# Patient Record
Sex: FEMALE | Race: ASIAN | NOT HISPANIC OR LATINO
[De-identification: names, ages, dates, MRNs, and addresses within clinical notes are randomized per-mention and may not be internally consistent; named-entity substitution may affect disease eponyms.]

---

## 2017-05-22 ENCOUNTER — APPOINTMENT (OUTPATIENT)
Dept: CT IMAGING | Facility: CLINIC | Age: 62
End: 2017-05-22

## 2017-05-22 ENCOUNTER — OUTPATIENT (OUTPATIENT)
Dept: OUTPATIENT SERVICES | Facility: HOSPITAL | Age: 62
LOS: 1 days | End: 2017-05-22
Payer: COMMERCIAL

## 2017-05-22 DIAGNOSIS — Z00.8 ENCOUNTER FOR OTHER GENERAL EXAMINATION: ICD-10-CM

## 2017-05-22 PROCEDURE — 82565 ASSAY OF CREATININE: CPT

## 2017-05-22 PROCEDURE — 74177 CT ABD & PELVIS W/CONTRAST: CPT

## 2017-05-26 ENCOUNTER — OUTPATIENT (OUTPATIENT)
Dept: OUTPATIENT SERVICES | Facility: HOSPITAL | Age: 62
LOS: 1 days | End: 2017-05-26
Payer: COMMERCIAL

## 2017-05-26 ENCOUNTER — APPOINTMENT (OUTPATIENT)
Dept: CT IMAGING | Facility: CLINIC | Age: 62
End: 2017-05-26

## 2017-05-26 DIAGNOSIS — Z00.8 ENCOUNTER FOR OTHER GENERAL EXAMINATION: ICD-10-CM

## 2017-05-26 PROCEDURE — 71250 CT THORAX DX C-: CPT

## 2017-07-06 ENCOUNTER — APPOINTMENT (OUTPATIENT)
Dept: PULMONOLOGY | Facility: CLINIC | Age: 62
End: 2017-07-06

## 2017-07-06 VITALS
RESPIRATION RATE: 14 BRPM | WEIGHT: 154 LBS | HEIGHT: 61 IN | HEART RATE: 80 BPM | BODY MASS INDEX: 29.07 KG/M2 | DIASTOLIC BLOOD PRESSURE: 70 MMHG | OXYGEN SATURATION: 99 % | SYSTOLIC BLOOD PRESSURE: 125 MMHG

## 2017-07-06 DIAGNOSIS — R42 DIZZINESS AND GIDDINESS: ICD-10-CM

## 2017-07-06 DIAGNOSIS — Z78.9 OTHER SPECIFIED HEALTH STATUS: ICD-10-CM

## 2017-07-06 DIAGNOSIS — Z82.49 FAMILY HISTORY OF ISCHEMIC HEART DISEASE AND OTHER DISEASES OF THE CIRCULATORY SYSTEM: ICD-10-CM

## 2017-07-06 DIAGNOSIS — Z80.42 FAMILY HISTORY OF MALIGNANT NEOPLASM OF PROSTATE: ICD-10-CM

## 2017-07-06 DIAGNOSIS — M81.0 AGE-RELATED OSTEOPOROSIS W/OUT CURRENT PATHOLOGICAL FRACTURE: ICD-10-CM

## 2017-07-06 DIAGNOSIS — Z83.49 FAMILY HISTORY OF OTHER ENDOCRINE, NUTRITIONAL AND METABOLIC DISEASES: ICD-10-CM

## 2017-07-06 DIAGNOSIS — Z86.39 PERSONAL HISTORY OF OTHER ENDOCRINE, NUTRITIONAL AND METABOLIC DISEASE: ICD-10-CM

## 2017-07-06 RX ORDER — OMEPRAZOLE 40 MG/1
40 CAPSULE, DELAYED RELEASE ORAL
Refills: 0 | Status: ACTIVE | COMMUNITY

## 2017-07-07 ENCOUNTER — MEDICATION RENEWAL (OUTPATIENT)
Age: 62
End: 2017-07-07

## 2017-07-07 LAB
24R-OH-CALCIDIOL SERPL-MCNC: 69.9 PG/ML
25(OH)D3 SERPL-MCNC: 24.1 NG/ML

## 2017-07-08 ENCOUNTER — TRANSCRIPTION ENCOUNTER (OUTPATIENT)
Age: 62
End: 2017-07-08

## 2017-07-10 LAB — ACE BLD-CCNC: 46 U/L

## 2017-07-11 LAB — HP PNL SER: NORMAL

## 2017-08-09 ENCOUNTER — CHART COPY (OUTPATIENT)
Age: 62
End: 2017-08-09

## 2017-09-06 ENCOUNTER — APPOINTMENT (OUTPATIENT)
Dept: PULMONOLOGY | Facility: CLINIC | Age: 62
End: 2017-09-06
Payer: COMMERCIAL

## 2017-09-06 VITALS
HEIGHT: 60 IN | DIASTOLIC BLOOD PRESSURE: 70 MMHG | RESPIRATION RATE: 14 BRPM | SYSTOLIC BLOOD PRESSURE: 120 MMHG | BODY MASS INDEX: 31.22 KG/M2 | OXYGEN SATURATION: 98 % | HEART RATE: 79 BPM | WEIGHT: 159 LBS

## 2017-09-06 PROCEDURE — 94010 BREATHING CAPACITY TEST: CPT

## 2017-09-06 PROCEDURE — 99214 OFFICE O/P EST MOD 30 MIN: CPT | Mod: 25

## 2017-10-18 ENCOUNTER — APPOINTMENT (OUTPATIENT)
Dept: MAMMOGRAPHY | Facility: CLINIC | Age: 62
End: 2017-10-18
Payer: COMMERCIAL

## 2017-10-18 ENCOUNTER — APPOINTMENT (OUTPATIENT)
Dept: ULTRASOUND IMAGING | Facility: CLINIC | Age: 62
End: 2017-10-18
Payer: COMMERCIAL

## 2017-10-18 ENCOUNTER — OUTPATIENT (OUTPATIENT)
Dept: OUTPATIENT SERVICES | Facility: HOSPITAL | Age: 62
LOS: 1 days | End: 2017-10-18
Payer: COMMERCIAL

## 2017-10-18 DIAGNOSIS — Z00.8 ENCOUNTER FOR OTHER GENERAL EXAMINATION: ICD-10-CM

## 2017-10-18 PROCEDURE — G0202: CPT | Mod: 26

## 2017-10-18 PROCEDURE — 76641 ULTRASOUND BREAST COMPLETE: CPT | Mod: 26,50

## 2017-10-18 PROCEDURE — 77063 BREAST TOMOSYNTHESIS BI: CPT | Mod: 26

## 2017-10-18 PROCEDURE — 77067 SCR MAMMO BI INCL CAD: CPT

## 2017-10-18 PROCEDURE — 76641 ULTRASOUND BREAST COMPLETE: CPT

## 2017-10-18 PROCEDURE — 77063 BREAST TOMOSYNTHESIS BI: CPT

## 2017-11-05 ENCOUNTER — FORM ENCOUNTER (OUTPATIENT)
Age: 62
End: 2017-11-05

## 2017-11-06 ENCOUNTER — APPOINTMENT (OUTPATIENT)
Dept: CT IMAGING | Facility: CLINIC | Age: 62
End: 2017-11-06
Payer: COMMERCIAL

## 2017-11-06 ENCOUNTER — OUTPATIENT (OUTPATIENT)
Dept: OUTPATIENT SERVICES | Facility: HOSPITAL | Age: 62
LOS: 1 days | End: 2017-11-06
Payer: COMMERCIAL

## 2017-11-06 DIAGNOSIS — R93.8 ABNORMAL FINDINGS ON DIAGNOSTIC IMAGING OF OTHER SPECIFIED BODY STRUCTURES: ICD-10-CM

## 2017-11-06 PROCEDURE — 71250 CT THORAX DX C-: CPT | Mod: 26

## 2017-11-06 PROCEDURE — 71250 CT THORAX DX C-: CPT

## 2017-11-13 ENCOUNTER — FORM ENCOUNTER (OUTPATIENT)
Age: 62
End: 2017-11-13

## 2017-11-14 ENCOUNTER — OUTPATIENT (OUTPATIENT)
Dept: OUTPATIENT SERVICES | Facility: HOSPITAL | Age: 62
LOS: 1 days | End: 2017-11-14
Payer: COMMERCIAL

## 2017-11-14 ENCOUNTER — APPOINTMENT (OUTPATIENT)
Dept: CT IMAGING | Facility: CLINIC | Age: 62
End: 2017-11-14
Payer: COMMERCIAL

## 2017-11-14 DIAGNOSIS — R93.8 ABNORMAL FINDINGS ON DIAGNOSTIC IMAGING OF OTHER SPECIFIED BODY STRUCTURES: ICD-10-CM

## 2017-11-14 PROCEDURE — 74177 CT ABD & PELVIS W/CONTRAST: CPT

## 2017-11-14 PROCEDURE — 74177 CT ABD & PELVIS W/CONTRAST: CPT | Mod: 26

## 2017-11-14 PROCEDURE — 82565 ASSAY OF CREATININE: CPT

## 2017-11-27 ENCOUNTER — CLINICAL ADVICE (OUTPATIENT)
Age: 62
End: 2017-11-27

## 2018-01-08 ENCOUNTER — APPOINTMENT (OUTPATIENT)
Dept: PULMONOLOGY | Facility: CLINIC | Age: 63
End: 2018-01-08

## 2018-01-31 ENCOUNTER — OUTPATIENT (OUTPATIENT)
Dept: OUTPATIENT SERVICES | Facility: HOSPITAL | Age: 63
LOS: 1 days | End: 2018-01-31
Payer: COMMERCIAL

## 2018-01-31 ENCOUNTER — APPOINTMENT (OUTPATIENT)
Dept: CT IMAGING | Facility: CLINIC | Age: 63
End: 2018-01-31

## 2018-01-31 DIAGNOSIS — Z00.8 ENCOUNTER FOR OTHER GENERAL EXAMINATION: ICD-10-CM

## 2018-01-31 PROCEDURE — 82565 ASSAY OF CREATININE: CPT

## 2018-01-31 PROCEDURE — 74177 CT ABD & PELVIS W/CONTRAST: CPT | Mod: 26

## 2018-01-31 PROCEDURE — 74177 CT ABD & PELVIS W/CONTRAST: CPT

## 2018-07-22 PROBLEM — Z78.9 ALCOHOL USE: Status: ACTIVE | Noted: 2017-07-06

## 2018-12-06 ENCOUNTER — APPOINTMENT (OUTPATIENT)
Dept: PULMONOLOGY | Facility: CLINIC | Age: 63
End: 2018-12-06
Payer: MEDICARE

## 2018-12-06 ENCOUNTER — FORM ENCOUNTER (OUTPATIENT)
Age: 63
End: 2018-12-06

## 2018-12-06 VITALS
HEART RATE: 83 BPM | HEIGHT: 59 IN | OXYGEN SATURATION: 98 % | BODY MASS INDEX: 32.25 KG/M2 | RESPIRATION RATE: 17 BRPM | SYSTOLIC BLOOD PRESSURE: 143 MMHG | WEIGHT: 160 LBS | DIASTOLIC BLOOD PRESSURE: 92 MMHG

## 2018-12-06 PROCEDURE — 71046 X-RAY EXAM CHEST 2 VIEWS: CPT

## 2018-12-06 PROCEDURE — 94618 PULMONARY STRESS TESTING: CPT

## 2018-12-06 PROCEDURE — 99215 OFFICE O/P EST HI 40 MIN: CPT | Mod: 25

## 2018-12-06 NOTE — REVIEW OF SYSTEMS
[As Noted in HPI] : as noted in HPI [Myalgias] : myalgias [Arthralgias] : arthralgias [Negative] : Sleep Disorder

## 2018-12-07 ENCOUNTER — OUTPATIENT (OUTPATIENT)
Dept: OUTPATIENT SERVICES | Facility: HOSPITAL | Age: 63
LOS: 1 days | End: 2018-12-07
Payer: MEDICARE

## 2018-12-07 ENCOUNTER — APPOINTMENT (OUTPATIENT)
Dept: CT IMAGING | Facility: CLINIC | Age: 63
End: 2018-12-07
Payer: MEDICARE

## 2018-12-07 ENCOUNTER — APPOINTMENT (OUTPATIENT)
Dept: PULMONOLOGY | Facility: CLINIC | Age: 63
End: 2018-12-07
Payer: MEDICARE

## 2018-12-07 DIAGNOSIS — Z00.8 ENCOUNTER FOR OTHER GENERAL EXAMINATION: ICD-10-CM

## 2018-12-07 PROCEDURE — 71250 CT THORAX DX C-: CPT | Mod: 26

## 2018-12-07 PROCEDURE — 94729 DIFFUSING CAPACITY: CPT

## 2018-12-07 PROCEDURE — 94727 GAS DIL/WSHOT DETER LNG VOL: CPT

## 2018-12-07 PROCEDURE — 71250 CT THORAX DX C-: CPT

## 2018-12-07 PROCEDURE — 94010 BREATHING CAPACITY TEST: CPT

## 2018-12-07 NOTE — ASSESSMENT
[FreeTextEntry1] : Ms. Mccarthy is a 61 year old female born in [place] former smoker with hx of HLD, osteoporosis, arthritis, migraines, COPD, abnormal CT and vertigo who presents to the office today for a pulmonary reevaluation. \par \par Problem 1: Abnormal CT scan\par -most consistent with inflammation. ?Hypersensitivity pneumonitis or sarcoidosis ; lung Cancer @ [facility]\par -ACE level and hypersensitivity level - both normal\par -Follow up CT scan (11/17) - get now - yearly CAT scan going forward\par -PPD (-)\par \par Problem 2: COPD at risk secondary to prior smoking hx. \par -smoking cessation. \par \par Problem 3: Nicotine addiction\par -successful smoking cessation\par \par Problem 4: GERD\par -continue Omeprazole 40 mg - pre-breakfast \par -Rule of 2's: avoid eating too much, eating too late, eating too spicy, or eating within 2 hours of sleeping\par -Things to avoid including overeating, spicy foods, tight clothing, eating within three hours of bed, this list is not all inclusive. \par -For treatment of reflux, possible options discussed including diet control, H2 blockers, PPIs, as well as coating motility agents discussed as treatment options. Timing of meals and proximity of last meal to sleep were discussed. If symptoms persist, a formal gastrointestinal evaluation is needed.\par \par Problem 5: Postnasal drip\par -Olopatadine nasal spray .06% 1 sniff each nostril BID \par \par Problem 6: Overweight\par -Weight loss, exercise, and diet control were discussed and are highly encouraged. Treatment options were given such as, aqua therapy, and contacting a nutritionist. Recommended to use the elliptical, stationary bike, less use of treadmill. Mindful eating was explained to the patient Obesity is associated with worsening asthma, shortness of breath, and potential for cardiac disease, diabetes, and other underlying medical conditions. \par \par Problem 7: (+) VARGHESE\par -recommended to get Dental Device\par -Recommending home sleep study.  variability in home sleep study will send for lab sleep study\par Sleep apnea is associated with adverse clinical consequences which an affect most organ systems. Cardiovascular disease risk includes arrhythmias, atrial fibrillation, hypertension, coronary artery disease, and stroke. Metabolic disorders include diabetes type 2, non-alcoholic fatty liver disease. Mood disorder especially depression; and cognitive decline especially in the elderly. Associations with chronic reflux/Danielle’s esophagus some but not all inclusive. \par -Reasons include arousal consistent with hypopnea; respiratory events most prominent in REM sleep or supine position; therefore sleep staging and body position are important for accurate diagnosis and estimation of AHI. \par \par problem 8: low vitamin D\par -Supplement 50,000 q 2 months\par  \par Health maintenance: discussed Pneumovax and influenza \par She is encouraged to call with any questions, concerns, or changes\par

## 2018-12-07 NOTE — PROCEDURE
[FreeTextEntry1] : CXR reveals a normal sized heart; no evidence of infiltrate or effusion--a normal appearing chest radiograph \par \par 6 minute walk test reveals a low saturation of 93% with no evidence of dyspnea or fatigue; walked 0.241 miles or 388.41 meters \par \par PFT revealed normal flows, with a FEV1 of 2.46,which is 120% of predicted, normal lung volumes, and a diffusion of 16.3, which is 84% of predicted with a normal flow volume loop \par \par Chest CT (Nov. 6. 2017) reveals upper abdominal mesenteric stranding with small lymph node is somewhat more prominent than 5/26/17 which may partly technical but is indeterminate.

## 2018-12-07 NOTE — ADDENDUM
[FreeTextEntry1] : **Amended by Berlin Aguilera on 12/7/18**\par \par All medical record entries made by krys Yi were at Dr. Eddie Agudelo's, direction and personally dictated by me on (12/6/2018). I have reviewed the chart and agree that the record accurately reflects my personal performance of the history, physical exam, assessment and plan. I have also personally directed, reviewed, and agree with the discharge instructions.

## 2018-12-07 NOTE — HISTORY OF PRESENT ILLNESS
[FreeTextEntry1] : Ms. Mccarthy is a 63 year old female with a history of abnormal chest CT, COPD, GERD, low vitamin D, nicotine addiction, overweight, PND and snoring presenting to the office today for a follow up visit. Her chief complaint is shortness of breath. \par -she states that she has been feeling a great deal of chest pressure and a sharp pain in her left chest\par -she notes that she had stopped smoking a long time ago \par -she reports that she has gotten colds in the past, in which she got frequent nose bleeds\par -she states that she gets short of breath occasionally\par -she states that her ears have been very itchy\par -she notes that her sleep has been poor, as she wakes up almost every hour\par -she states that she gets frequent muscle and joint pains, as well as ankle swelling\par -she denies any headaches, nausea, vomiting, fever, chills, sweats, chest pain, chest pressure, diarrhea, constipation, dysphagia, dizziness, leg pain, itchy ears, heartburn, reflux, or sour taste in the mouth.

## 2018-12-07 NOTE — REASON FOR VISIT
[Follow-Up] : a follow-up visit [FreeTextEntry1] : abnormal chest CT, COPD, GERD, low vitamin D, nicotine addiction, overweight, PND and snoring

## 2019-01-02 ENCOUNTER — OUTPATIENT (OUTPATIENT)
Dept: OUTPATIENT SERVICES | Facility: HOSPITAL | Age: 64
LOS: 1 days | End: 2019-01-02
Payer: MEDICARE

## 2019-01-02 ENCOUNTER — APPOINTMENT (OUTPATIENT)
Dept: CT IMAGING | Facility: CLINIC | Age: 64
End: 2019-01-02
Payer: MEDICARE

## 2019-01-02 DIAGNOSIS — Z00.8 ENCOUNTER FOR OTHER GENERAL EXAMINATION: ICD-10-CM

## 2019-01-02 PROCEDURE — 74177 CT ABD & PELVIS W/CONTRAST: CPT | Mod: 26

## 2019-01-02 PROCEDURE — 74177 CT ABD & PELVIS W/CONTRAST: CPT

## 2019-01-02 PROCEDURE — 82565 ASSAY OF CREATININE: CPT

## 2019-04-08 ENCOUNTER — NON-APPOINTMENT (OUTPATIENT)
Age: 64
End: 2019-04-08

## 2019-04-08 ENCOUNTER — APPOINTMENT (OUTPATIENT)
Dept: PULMONOLOGY | Facility: CLINIC | Age: 64
End: 2019-04-08
Payer: MEDICARE

## 2019-04-08 VITALS
HEIGHT: 59 IN | WEIGHT: 160 LBS | BODY MASS INDEX: 32.25 KG/M2 | HEART RATE: 84 BPM | SYSTOLIC BLOOD PRESSURE: 130 MMHG | DIASTOLIC BLOOD PRESSURE: 75 MMHG | RESPIRATION RATE: 17 BRPM | OXYGEN SATURATION: 96 %

## 2019-04-08 PROCEDURE — 99214 OFFICE O/P EST MOD 30 MIN: CPT | Mod: 25

## 2019-04-08 PROCEDURE — 94010 BREATHING CAPACITY TEST: CPT

## 2019-04-08 NOTE — ASSESSMENT
[FreeTextEntry1] : Ms. Mccarthy is a 61 year old female born in [place] former smoker with hx of HLD, osteoporosis, arthritis, migraines, COPD, abnormal CT and vertigo who presents to the office today for a follow up visit. She is currently stable from a pulmonary perspective. \par \par Problem 1: Abnormal CT scan\par -most consistent with inflammation. ?Hypersensitivity pneumonitis or sarcoidosis ; lung Cancer @ [facility]\par -ACE level and hypersensitivity level - both normal\par -Follow up CT scan 12/2019\par -PPD (-)\par \par Problem 2: COPD at risk secondary to prior smoking hx. \par -smoking cessation. \par \par Problem 3: Nicotine addiction\par -successful smoking cessation\par \par Problem 4: GERD\par -continue Omeprazole 40 mg - pre-breakfast \par -Rule of 2's: avoid eating too much, eating too late, eating too spicy, or eating within 2 hours of sleeping\par -Things to avoid including overeating, spicy foods, tight clothing, eating within three hours of bed, this list is not all inclusive. \par -For treatment of reflux, possible options discussed including diet control, H2 blockers, PPIs, as well as coating motility agents discussed as treatment options. Timing of meals and proximity of last meal to sleep were discussed. If symptoms persist, a formal gastrointestinal evaluation is needed.\par \par Problem 5: Postnasal drip\par -Olopatadine nasal spray .06% 1 sniff each nostril BID \par \par Problem 6: Overweight\par -Weight loss, exercise, and diet control were discussed and are highly encouraged. Treatment options were given such as, aqua therapy, and contacting a nutritionist. Recommended to use the elliptical, stationary bike, less use of treadmill. Mindful eating was explained to the patient Obesity is associated with worsening asthma, shortness of breath, and potential for cardiac disease, diabetes, and other underlying medical conditions. \par \par Problem 7: (+) VARGHESE\par -recommended to get Dental Device\par -Recommending home sleep study.  variability in home sleep study will send for lab sleep study\par Sleep apnea is associated with adverse clinical consequences which an affect most organ systems. Cardiovascular disease risk includes arrhythmias, atrial fibrillation, hypertension, coronary artery disease, and stroke. Metabolic disorders include diabetes type 2, non-alcoholic fatty liver disease. Mood disorder especially depression; and cognitive decline especially in the elderly. Associations with chronic reflux/Danielle’s esophagus some but not all inclusive. \par -Reasons include arousal consistent with hypopnea; respiratory events most prominent in REM sleep or supine position; therefore sleep staging and body position are important for accurate diagnosis and estimation of AHI. \par \par problem 8: low vitamin D\par -Supplement 50,000 q 2 months\par  \par Problem 9: Health maintenance\par - Discussed Pneumovax and influenza \par \par f/u in 6-8 weeks\par pt is encouraged to call or fax the office with any questions or concerns. \par Explained to the pt in full detail with demonstrations how to use the inhalers and inhaler hygiene. \par -Education provided to the PT regarding their visit and conditions. \par She is encouraged to call with any questions, concerns, or changes\par

## 2019-04-08 NOTE — PROCEDURE
[FreeTextEntry1] : PFT- spi reveals normal flows; FEV1 was 2.45L which is 147% of predicted; normal flow volume loop \par \par She had a CT chest scan performed on 12/7/2018, which showed unchanged b/l lower lobe nodules measure up to 4mm. Unchanged large right lower pole left. There is no change from prior study.

## 2019-04-08 NOTE — HISTORY OF PRESENT ILLNESS
[FreeTextEntry1] : Ms. Mccarthy is a 63 year old female with a history of abnormal chest CT, COPD, GERD, low vitamin D, nicotine addiction, overweight, PND and snoring presenting to the office today for a follow up visit. Her chief complaint is chest pain/pressure. \par - She comes in stating that she feels generally well \par - She states that she sometimes has chest pain/ pressure, but she feels that everyone gets it\par - She sometimes has a sour taste in the mouth\par - She notes intermittent leg swelling that can be severe\par - She has itchy ears for which she followed up with a specialist. She was given a cream which has controlled her symptom. \par - She has gained weight \par - She is ambulating every day for 15 minutes with her dog. \par - Her bowels are regular. \par - She states that she generally feels okay when she wakes up. She is up early everyday. \par - She is not currently smoking. \par - She denies any headaches, nausea, vomiting, fever, chills, sweats, palpitations, SOB, coughing, wheezing, fatigue, diarrhea, constipation, dysphagia, myalgias, dizziness, leg pain, itchy eyes, heartburn, reflux

## 2019-04-08 NOTE — ADDENDUM
[FreeTextEntry1] : Documented by Leif Thorpe acting as a scribe for Dr. Eddie Agudelo on 4/8/2019\par \par All medical record entries made by the Scribe were at my, Dr. Eddie Agudelo's, direction and personally dictated by me on 4/8/2019. I have reviewed the chart and agree that the record accurately reflects my personal performance of the history, physical exam, assessment and plan. I have also personally directed, reviewed, and agree with the discharge instructions. \par \par \par \par \par

## 2020-02-03 ENCOUNTER — NON-APPOINTMENT (OUTPATIENT)
Age: 65
End: 2020-02-03

## 2020-02-03 ENCOUNTER — APPOINTMENT (OUTPATIENT)
Dept: PULMONOLOGY | Facility: CLINIC | Age: 65
End: 2020-02-03
Payer: MEDICARE

## 2020-02-03 VITALS
WEIGHT: 151 LBS | BODY MASS INDEX: 30.44 KG/M2 | DIASTOLIC BLOOD PRESSURE: 80 MMHG | HEIGHT: 59 IN | RESPIRATION RATE: 17 BRPM | HEART RATE: 76 BPM | OXYGEN SATURATION: 98 % | SYSTOLIC BLOOD PRESSURE: 122 MMHG

## 2020-02-03 PROCEDURE — 94010 BREATHING CAPACITY TEST: CPT

## 2020-02-03 PROCEDURE — 99214 OFFICE O/P EST MOD 30 MIN: CPT | Mod: 25

## 2020-02-03 PROCEDURE — 94618 PULMONARY STRESS TESTING: CPT

## 2020-02-03 PROCEDURE — 95012 NITRIC OXIDE EXP GAS DETER: CPT

## 2020-02-03 NOTE — ASSESSMENT
[FreeTextEntry1] : Ms. Mccarthy is a 64 year old female born in [place] former smoker with hx of HLD, osteoporosis, arthritis, migraines, COPD, abnormal CT and vertigo who presents to the office today for a follow up visit. She is currently stable from a pulmonary perspective, except chest pain radiating to the jaw.\par \par Problem 1: Abnormal CT scan\par -most consistent with inflammation. ?Hypersensitivity pneumonitis or sarcoidosis ; lung Cancer @ [facility]\par -ACE level and hypersensitivity level - both normal (past)\par -Follow up CT scan 12/2019 (overdue)\par -PPD (-)\par \par CAT scans are the only radiological modality to identify abnormalities w/in the lungs with regards to nodules/masses/lymph nodes. Risks, benefits were reviewed in detail. The guidelines for abnormalities include follow up CT scans at various intervals which could range from 6 weeks to 1 year intervals. If there is a change for the worse then consideration for a biopsy will be considered if you are a candidate. Second opinion evaluation with a thoracic surgeon or an interventional radiologist could be offered. \par \par Problem 2: COPD at risk secondary to prior smoking hx. \par -smoking cessation. \par \par Problem 2A: Cardiac - chest pain to jaw\par -Recommended to complete a cardiac evaluation with Dr. Phillips\par \par Problem 3: Nicotine addiction\par -successful smoking cessation\par \par Problem 4: GERD - ?active\par -continue Omeprazole 40 mg - pre-breakfast \par -Add Pepcid 40 mg QHS \par -Rule of 2's: avoid eating too much, eating too late, eating too spicy, or eating within 2 hours of sleeping\par -Things to avoid including overeating, spicy foods, tight clothing, eating within three hours of bed, this list is not all inclusive. \par -For treatment of reflux, possible options discussed including diet control, H2 blockers, PPIs, as well as coating motility agents discussed as treatment options. Timing of meals and proximity of last meal to sleep were discussed. If symptoms persist, a formal gastrointestinal evaluation is needed.\par \par Problem 5: Postnasal drip / allergy\par -Olopatadine nasal spray .06% 1 sniff each nostril BID \par \par Environmental measures for allergies were encouraged including mattress and pillow cover, air purifier, and environmental controls. \par \par Problem 6: Overweight\par -Weight loss, exercise, and diet control were discussed and are highly encouraged. Treatment options were given such as, aqua therapy, and contacting a nutritionist. Recommended to use the elliptical, stationary bike, less use of treadmill. Mindful eating was explained to the patient Obesity is associated with worsening asthma, shortness of breath, and potential for cardiac disease, diabetes, and other underlying medical conditions. \par \par Problem 7: (+) VARGHESE\par -recommended to get Dental Device\par -Recommending home sleep study.  variability in home sleep study will send for lab sleep study\par Sleep apnea is associated with adverse clinical consequences which an affect most organ systems. Cardiovascular disease risk includes arrhythmias, atrial fibrillation, hypertension, coronary artery disease, and stroke. Metabolic disorders include diabetes type 2, non-alcoholic fatty liver disease. Mood disorder especially depression; and cognitive decline especially in the elderly. Associations with chronic reflux/Danielle’s esophagus some but not all inclusive. \par -Reasons include arousal consistent with hypopnea; respiratory events most prominent in REM sleep or supine position; therefore sleep staging and body position are important for accurate diagnosis and estimation of AHI. \par \par problem 8: low vitamin D\par -Supplement 50,000 q 2 months\par \par Has been associated with asthma exacerbations and increased allergic symptoms. The goal based on recent information is maintaining levels between 50-70 and low normal is 30. Recommended 50,000 units every two weeks to once a month depending on the level.\par  \par Problem 9: Health maintenance\par - Discussed Pneumovax and influenza  (completed)\par \par f/u in 4 months with full PFTs\par pt is encouraged to call or fax the office with any questions or concerns. \par Explained to the pt in full detail with demonstrations how to use the inhalers and inhaler hygiene. \par -Education provided to the PT regarding their visit and conditions. \par She is encouraged to call with any questions, concerns, or changes\par

## 2020-02-03 NOTE — ADDENDUM
[FreeTextEntry1] : Documented by Monroe Guzman acting as a scribe for Dr. Eddie Agudelo on 02/03/2020.\par \par All medical record entries made by the Scribe were at my, Dr. Eddie Agudelo's, direction and personally dictated by me on 02/03/2020. I have reviewed the chart and agree that the record accurately reflects my personal performance of the history, physical exam, assessment and plan. I have also personally directed, reviewed, and agree with the discharge instructions. \par

## 2020-02-03 NOTE — PROCEDURE
[FreeTextEntry1] : PFT revealed normal flows, with a FEV1 of 1.81L, which is 110% of predicted, with a flattened inspiratory limb \par \par FENO was 12; a normal value being less than 25\par Fractional exhaled nitric oxide (FENO) is regarded as a simple, noninvasive method for assessing eosinophilic airway inflammation. Produced by a variety of cells within the lung, nitric oxide (NO) concentrations are generally low in healthy individuals. However, high concentrations of NO appear to be involved in nonspecific host defense mechanisms and chronic inflammatory diseases such as asthma. The American Thor[employer] Society (ATS) therefore has recommended using FENO to aid in the diagnosis and monitoring of eosinophilic airway inflammation and asthma, and for identifying steroid responsive individuals whose chronic respiratory symptoms may be caused by airway inflammation. \par \par 6 minute walk test reveals a low saturation of 93% with no evidence of dyspnea or fatigue; walked 391.2 meters. Patient was unable to walk quickly due to back injection.

## 2020-02-12 ENCOUNTER — NON-APPOINTMENT (OUTPATIENT)
Age: 65
End: 2020-02-12

## 2020-02-12 ENCOUNTER — APPOINTMENT (OUTPATIENT)
Dept: CARDIOLOGY | Facility: CLINIC | Age: 65
End: 2020-02-12
Payer: MEDICARE

## 2020-02-12 VITALS
RESPIRATION RATE: 16 BRPM | HEIGHT: 59 IN | HEART RATE: 16 BPM | WEIGHT: 151 LBS | DIASTOLIC BLOOD PRESSURE: 83 MMHG | BODY MASS INDEX: 30.44 KG/M2 | SYSTOLIC BLOOD PRESSURE: 140 MMHG

## 2020-02-12 DIAGNOSIS — Z87.891 PERSONAL HISTORY OF NICOTINE DEPENDENCE: ICD-10-CM

## 2020-02-12 PROCEDURE — 93000 ELECTROCARDIOGRAM COMPLETE: CPT

## 2020-02-12 PROCEDURE — 99204 OFFICE O/P NEW MOD 45 MIN: CPT

## 2020-02-12 RX ORDER — OLOPATADINE HYDROCHLORIDE 665 UG/1
0.6 SPRAY, METERED NASAL
Qty: 3 | Refills: 1 | Status: COMPLETED | COMMUNITY
Start: 2017-07-06 | End: 2020-02-12

## 2020-08-06 ENCOUNTER — RX RENEWAL (OUTPATIENT)
Age: 65
End: 2020-08-06

## 2020-09-03 ENCOUNTER — OUTPATIENT (OUTPATIENT)
Dept: OUTPATIENT SERVICES | Facility: HOSPITAL | Age: 65
LOS: 1 days | End: 2020-09-03
Payer: MEDICARE

## 2020-09-03 ENCOUNTER — APPOINTMENT (OUTPATIENT)
Dept: ULTRASOUND IMAGING | Facility: CLINIC | Age: 65
End: 2020-09-03
Payer: MEDICARE

## 2020-09-03 ENCOUNTER — APPOINTMENT (OUTPATIENT)
Dept: MAMMOGRAPHY | Facility: CLINIC | Age: 65
End: 2020-09-03
Payer: MEDICARE

## 2020-09-03 DIAGNOSIS — Z12.31 ENCOUNTER FOR SCREENING MAMMOGRAM FOR MALIGNANT NEOPLASM OF BREAST: ICD-10-CM

## 2020-09-03 DIAGNOSIS — R92.2 INCONCLUSIVE MAMMOGRAM: ICD-10-CM

## 2020-09-03 PROCEDURE — 77067 SCR MAMMO BI INCL CAD: CPT

## 2020-09-03 PROCEDURE — 77067 SCR MAMMO BI INCL CAD: CPT | Mod: 26

## 2020-09-03 PROCEDURE — 77063 BREAST TOMOSYNTHESIS BI: CPT

## 2020-09-03 PROCEDURE — 76641 ULTRASOUND BREAST COMPLETE: CPT | Mod: 26,50

## 2020-09-03 PROCEDURE — 77063 BREAST TOMOSYNTHESIS BI: CPT | Mod: 26

## 2020-09-03 PROCEDURE — 76641 ULTRASOUND BREAST COMPLETE: CPT

## 2020-09-24 ENCOUNTER — APPOINTMENT (OUTPATIENT)
Dept: PULMONOLOGY | Facility: CLINIC | Age: 65
End: 2020-09-24
Payer: MEDICARE

## 2020-09-24 VITALS
WEIGHT: 156.13 LBS | TEMPERATURE: 97.4 F | DIASTOLIC BLOOD PRESSURE: 70 MMHG | SYSTOLIC BLOOD PRESSURE: 124 MMHG | OXYGEN SATURATION: 98 % | HEIGHT: 59 IN | HEART RATE: 79 BPM | BODY MASS INDEX: 31.48 KG/M2 | RESPIRATION RATE: 17 BRPM

## 2020-09-24 PROCEDURE — 95012 NITRIC OXIDE EXP GAS DETER: CPT

## 2020-09-24 PROCEDURE — 99214 OFFICE O/P EST MOD 30 MIN: CPT | Mod: 25

## 2020-09-24 PROCEDURE — 94618 PULMONARY STRESS TESTING: CPT

## 2020-09-24 NOTE — ASSESSMENT
[FreeTextEntry1] : Ms. Mccarthy is a 64 year old female born in [place] former smoker with hx of HLD, osteoporosis, arthritis, migraines, COPD, abnormal CT and vertigo who presents to the office today for a follow up visit. She is currently stable from a pulmonary perspective, except chest pain radiating to the jaw. - plagued with some PND \par \par Problem 1: Abnormal CT scan\par -most consistent with inflammation. ?Hypersensitivity pneumonitis or sarcoidosis ; lung Cancer @ [facility]\par -ACE level and hypersensitivity level - both normal (past)\par -Follow up CT scan 12/2019 (overdue)\par -PPD (-)\par \par CAT scans are the only radiological modality to identify abnormalities w/in the lungs with regards to nodules/masses/lymph nodes. Risks, benefits were reviewed in detail. The guidelines for abnormalities include follow up CT scans at various intervals which could range from 6 weeks to 1 year intervals. If there is a change for the worse then consideration for a biopsy will be considered if you are a candidate. Second opinion evaluation with a thoracic surgeon or an interventional radiologist could be offered. \par \par Problem 2: COPD at risk secondary to prior smoking hx. \par -smoking cessation. \par \par Problem 2A: Asthma\par -add Breo Ellipta 200 at 1 inhalation QHS QD\par  - Asthma is believed to be caused by inherited (genetic) and environmental factor, but its exact cause is unknown. Asthma may be triggered by allergens, lung infections, or irritants in the air. Asthma triggers are different for each person.\par -Inhaler technique reviewed as well as oral hygiene techniques reviewed with patient. Avoidance of cold air, extremes of temperature, rescue inhaler should be used before exercise. Order of medication reviewed with patient. Recommended use of a cool mist humidifier in the bedroom. \par \par Problem 2A: Cardiac - chest pain to jaw\par -Recommended to complete a cardiac evaluation with Dr. Phillips\par \par Problem 3: Nicotine addiction\par -successful smoking cessation\par \par Problem 4: GERD - \par -continue Omeprazole 40 mg - pre-breakfast \par -continue Pepcid 40 mg QHS \par -Rule of 2's: avoid eating too much, eating too late, eating too spicy, or eating within 2 hours of sleeping\par -Things to avoid including overeating, spicy foods, tight clothing, eating within three hours of bed, this list is not all inclusive. \par -For treatment of reflux, possible options discussed including diet control, H2 blockers, PPIs, as well as coating motility agents discussed as treatment options. Timing of meals and proximity of last meal to sleep were discussed. If symptoms persist, a formal gastrointestinal evaluation is needed.\par \par Problem 5: Postnasal drip / allergy\par -Olopatadine nasal spray .06% 1 sniff each nostril BID\par -add Clarinex 5 mg QAM  \par \par Environmental measures for allergies were encouraged including mattress and pillow cover, air purifier, and environmental controls. \par \par Problem 6: Overweight\par -Weight loss, exercise, and diet control were discussed and are highly encouraged. Treatment options were given such as, aqua therapy, and contacting a nutritionist. Recommended to use the elliptical, stationary bike, less use of treadmill. Mindful eating was explained to the patient Obesity is associated with worsening asthma, shortness of breath, and potential for cardiac disease, diabetes, and other underlying medical conditions. \par \par Problem 7: (+) VARGHESE\par -recommended to get Dental Device\par -Recommending home sleep study.  variability in home sleep study will send for lab sleep study\par Sleep apnea is associated with adverse clinical consequences which an affect most organ systems. Cardiovascular disease risk includes arrhythmias, atrial fibrillation, hypertension, coronary artery disease, and stroke. Metabolic disorders include diabetes type 2, non-alcoholic fatty liver disease. Mood disorder especially depression; and cognitive decline especially in the elderly. Associations with chronic reflux/Danielle’s esophagus some but not all inclusive. \par -Reasons include arousal consistent with hypopnea; respiratory events most prominent in REM sleep or supine position; therefore sleep staging and body position are important for accurate diagnosis and estimation of AHI. \par \par problem 8: low vitamin D\par -Supplement 50,000 q 2 months\par \par Has been associated with asthma exacerbations and increased allergic symptoms. The goal based on recent information is maintaining levels between 50-70 and low normal is 30. Recommended 50,000 units every two weeks to once a month depending on the level.\par  \par Problem 9: Health Maintenance/COVID19 Precautions:\par - Clean your hands often. Wash your hands often with soap and water for at least 20 seconds, especially after blowing your nose, coughing, or sneezing, or having been in a public place.\par - If soap and water are not available, use a hand  that contains at least 60% alcohol.\par - To the extent possible, avoid touching high-touch surfaces in public places - elevator buttons, door handles, handrails, handshaking with people, etc. Use a tissue or your sleeve to cover your hand or finger if you must touch something.\par - Wash your hands after touching surfaces in public places.\par Immune Support Recommendations:\par -OTC Vitamin C 500mg BID \par -OTC Quercetin 250-500mg BID \par -OTC Zinc 75-100mg per day \par -OTC Melatonin 1or 2mg a night \par -OTC Vitamin D 1-4000mg per day \par -OTC Tonic Water 8oz per day\par \par \par Problem 10: Health maintenance\par - Discussed Pneumovax and influenza  (completed)\par - Flu shot - 2020\par \par f/u in 4 months with full PFTs\par pt is encouraged to call or fax the office with any questions or concerns. \par Explained to the pt in full detail with demonstrations how to use the inhalers and inhaler hygiene. \par -Education provided to the PT regarding their visit and conditions. \par She is encouraged to call with any questions, concerns, or changes\par

## 2020-09-24 NOTE — ADDENDUM
[FreeTextEntry1] : Documented by Kaity Daniel acting as a scribe for Dr. Eddie Agudelo on 09/24/2020 \par \par All medical record entries made by the Scribe were at my, Dr. Eddie Agudelo's, direction and personally dictated by me on 09/24/2020 . I have reviewed the chart and agree that the record accurately reflects my personal performance of the history, physical exam, assessment and plan. I have also personally directed, reviewed, and agree with the discharge instructions.

## 2020-09-24 NOTE — PROCEDURE
[FreeTextEntry1] :  FENO was 17 ; normal value being less than 25\par Fractional exhaled nitric oxide (FENO) is regarded as a simple, noninvasive method for assessing eosinophilic airway inflammation. Produced by a variety of cells within the lung, nitric oxide (NO) concentrations are generally low in healthy individuals. However, high concentrations of NO appear to be involved in nonspecific host defense mechanisms and chronic inflammatory diseases such as asthma. The American Thoracic Society (ATS) therefore has recommended using FENO to aid in the diagnosis and monitoring of eosinophilic airway inflammation and asthma, and for identifying steroid responsive individuals whose chronic respiratory symptoms may be airway inflammation.\par \par 6 minute walk test reveals a low saturation of 92% with no evidence of dyspnea or fatigue; walked  391.2 meters\par

## 2020-09-24 NOTE — HISTORY OF PRESENT ILLNESS
[FreeTextEntry1] : Ms. Mccarthy is a 64 year old female with a history of abnormal chest CT, COPD, GERD, low vitamin D, nicotine addiction, overweight, PND and snoring presenting to the office today for a follow up visit. Her chief complaint is\par - she has been feeling generally okay \par - she notes she used to feel some pain in her jaw and nose but that has went away\par - she notes when she has a sour taste in her mouth, she feels the acid in her throat. \par - her sleep has been okay, if she has trouble sleeping then she takes a nap later on in the day. \par - her weight has been stable; though she would like to lose weight\par - she feels palpitations all the time, even when shes sitting she will feel palpitations. \par -  she has been walking in the mornings for 30 minutes to an hour. \par - she notes two months ago, she was cleaning the house, she sat down for a moment and she felt everything was dizzy and she felt she had no control of her body. She called 911 and she was sent to the hospital. Turns out her blood pressure was very high. \par - denies taking any new medications, vitamins, or supplements. \par - she has not been using anything for her breathing \par - she notes her arthritis is terrible\par - she cant have the polio shot anymore. Shes not sure on what to do. \par - she has not been using any allergy medications. \par - She  denies any visual issues, headaches, nausea, vomiting, fever, chills, sweats, chest pains, chest pressure, diarrhea, constipation, dysphagia, myalgia, leg swelling, leg pain, itchy eyes, itchy ears.

## 2020-09-25 DIAGNOSIS — Z01.812 ENCOUNTER FOR PREPROCEDURAL LABORATORY EXAMINATION: ICD-10-CM

## 2020-10-30 ENCOUNTER — APPOINTMENT (OUTPATIENT)
Dept: CT IMAGING | Facility: CLINIC | Age: 65
End: 2020-10-30
Payer: MEDICARE

## 2020-10-30 ENCOUNTER — RESULT REVIEW (OUTPATIENT)
Age: 65
End: 2020-10-30

## 2020-10-30 ENCOUNTER — OUTPATIENT (OUTPATIENT)
Dept: OUTPATIENT SERVICES | Facility: HOSPITAL | Age: 65
LOS: 1 days | End: 2020-10-30
Payer: MEDICARE

## 2020-10-30 DIAGNOSIS — R93.89 ABNORMAL FINDINGS ON DIAGNOSTIC IMAGING OF OTHER SPECIFIED BODY STRUCTURES: ICD-10-CM

## 2020-10-30 PROCEDURE — 71250 CT THORAX DX C-: CPT | Mod: 26

## 2020-10-30 PROCEDURE — 71250 CT THORAX DX C-: CPT

## 2020-11-13 ENCOUNTER — NON-APPOINTMENT (OUTPATIENT)
Age: 65
End: 2020-11-13

## 2020-12-03 ENCOUNTER — LABORATORY RESULT (OUTPATIENT)
Age: 65
End: 2020-12-03

## 2020-12-07 ENCOUNTER — APPOINTMENT (OUTPATIENT)
Dept: PULMONOLOGY | Facility: CLINIC | Age: 65
End: 2020-12-07

## 2021-01-25 ENCOUNTER — APPOINTMENT (OUTPATIENT)
Dept: CARDIOLOGY | Facility: CLINIC | Age: 66
End: 2021-01-25
Payer: MEDICARE

## 2021-01-25 ENCOUNTER — APPOINTMENT (OUTPATIENT)
Dept: PULMONOLOGY | Facility: CLINIC | Age: 66
End: 2021-01-25
Payer: MEDICARE

## 2021-01-25 VITALS
RESPIRATION RATE: 15 BRPM | TEMPERATURE: 97.2 F | BODY MASS INDEX: 31.45 KG/M2 | WEIGHT: 156 LBS | SYSTOLIC BLOOD PRESSURE: 130 MMHG | DIASTOLIC BLOOD PRESSURE: 82 MMHG | HEART RATE: 80 BPM | HEIGHT: 59 IN

## 2021-01-25 VITALS
HEIGHT: 59 IN | SYSTOLIC BLOOD PRESSURE: 120 MMHG | OXYGEN SATURATION: 98 % | RESPIRATION RATE: 17 BRPM | TEMPERATURE: 97.2 F | BODY MASS INDEX: 31.04 KG/M2 | DIASTOLIC BLOOD PRESSURE: 80 MMHG | HEART RATE: 85 BPM | WEIGHT: 154 LBS

## 2021-01-25 PROCEDURE — 99213 OFFICE O/P EST LOW 20 MIN: CPT | Mod: 25

## 2021-01-25 PROCEDURE — 93306 TTE W/DOPPLER COMPLETE: CPT

## 2021-01-25 PROCEDURE — 99214 OFFICE O/P EST MOD 30 MIN: CPT

## 2021-01-25 PROCEDURE — 93015 CV STRESS TEST SUPVJ I&R: CPT

## 2021-01-25 PROCEDURE — ZZZZZ: CPT

## 2021-01-25 RX ORDER — FAMOTIDINE 40 MG/1
40 TABLET, FILM COATED ORAL
Qty: 90 | Refills: 0 | Status: COMPLETED | COMMUNITY
Start: 2020-02-03 | End: 2021-01-25

## 2021-01-25 RX ORDER — DENOSUMAB 60 MG/ML
60 INJECTION SUBCUTANEOUS
Refills: 0 | Status: COMPLETED | COMMUNITY
End: 2021-01-25

## 2021-01-25 RX ORDER — CHOLECALCIFEROL (VITAMIN D3) 1250 MCG
1.25 MG CAPSULE ORAL
Qty: 12 | Refills: 0 | Status: ACTIVE | COMMUNITY
Start: 2020-10-21

## 2021-01-25 NOTE — HISTORY OF PRESENT ILLNESS
[FreeTextEntry1] : Ms. Mccarthy is a 65 year old female with a history of abnormal chest CT, COPD, GERD, low vitamin D, nicotine addiction, overweight, PND and snoring presenting to the office today for a follow up visit. Her chief complaint is\par \par -she notes stress and anxiety exacerbated by concern for husbands health\par -she notes intermittent chills\par -she notes intermittent diarrhea\par -she notes lower back pain with residual numbness that radiates down right lower extremity, given Rx but admits intermittent compliance. \par -she notes poor vision, declining\par -she notes intermittent cough\par -she notes intermittent wheeze\par -she notes compliance with inhalers only when symptomatic\par -she notes constant palpitations\par -she notes itchy ears\par -she notes dizziness spells while seated\par -she notes intermittent PND\par \par -denies any chest pain, chest pressure, constipation, dysphagia, sour taste in the mouth, dizziness, leg swelling, leg pain, myalgias, arthralgias, itchy eyes, heartburn, or reflux.\par

## 2021-01-25 NOTE — PROCEDURE
[FreeTextEntry1] : CT Chest (10.30.2020) reveals stable small solid nodules in both lower lobes when compared to previous exam \par

## 2021-01-25 NOTE — ASSESSMENT
[FreeTextEntry1] : Ms. Mccarthy is a 65 year old female born in [place] former smoker with hx of HLD, osteoporosis, arthritis, migraines, COPD, abnormal CT and vertigo who presents to the office today for a follow up visit. She is currently stable from a pulmonary perspective, except neuro Sx (dizziness). \par \par Problem 1: Abnormal CT scan\par -most consistent with inflammation. ?Hypersensitivity pneumonitis or sarcoidosis ; lung Cancer @ [facility]\par -ACE level and hypersensitivity level - both normal (past)\par -Follow up CT scan (last 10/2020, next 10/2021)\par -PPD (-)\par \par CAT scans are the only radiological modality to identify abnormalities w/in the lungs with regards to nodules/masses/lymph nodes. Risks, benefits were reviewed in detail. The guidelines for abnormalities include follow up CT scans at various intervals which could range from 6 weeks to 1 year intervals. If there is a change for the worse then consideration for a biopsy will be considered if you are a candidate. Second opinion evaluation with a thoracic surgeon or an interventional radiologist could be offered. \par \par Problem 2: COPD at risk secondary to prior smoking hx. \par -COPD is a progressive disease and although it can’t be cured , appropriate management can slow its progression, reduce frequency and severity of exacerbations, and improve symptoms and the patient quality of life. Hospitalizations are the greatest contributor to the total COPD costs and account for up to 87% of total COPD related costs. Exacerbations are the main cause of admissions and subsequently account for the 40-75% of COPD costs. Inhaled maintenance therapy reduces the incidence of exacerbations in patients with stable COPD. Incorrect inhaler use and nonadherence are major obstacles to achieving COPD treatment goals. Many COPD patients have challenges (impaired inhalation, limited dexterity, reduced cognition: that limit their ability to correctly use their COPD treatment devices resulting in reduced symptom control. Of most importance is smoking cessation and early intervention with respiratory illnesses and contemplation for pulmonary rehab to enhance quality of life.\par  \par Problem 2A: Asthma\par -continue Breo Ellipta 200 at 1 inhalation QHS QD\par -add Singulair 10 mg before bed \par  - Asthma is believed to be caused by inherited (genetic) and environmental factor, but its exact cause is unknown. Asthma may be triggered by allergens, lung infections, or irritants in the air. Asthma triggers are different for each person.\par -Inhaler technique reviewed as well as oral hygiene techniques reviewed with patient. Avoidance of cold air, extremes of temperature, rescue inhaler should be used before exercise. Order of medication reviewed with patient. Recommended use of a cool mist humidifier in the bedroom. \par \par Problem 2A: Cardiac - chest pain to jaw (resolved) \par -Recommended to complete a cardiac evaluation with Dr. Phillips\par \par Problem 3: Nicotine addiction (resolved) \par -successful smoking cessation\par Discussed for five minutes with the patient the risks/associations with continued smoking including COPD, emphysema, shortness of breath, renal cancer, bladder cancer, stroke risk, cardiac disease, etc. Smoking cessation was discussed at length and highly encouraged. Various options to aid cessation was discussed including use of Chantix, Nicotrol, nicotine products, laser therapy, hypnosis, Wellbutrin, etc.\par  \par Problem 4: GERD - \par -continue Omeprazole 40 mg - pre-breakfast \par -continue Pepcid 40 mg QHS \par -Rule of 2's: avoid eating too much, eating too late, eating too spicy, or eating within 2 hours of sleeping\par -Things to avoid including overeating, spicy foods, tight clothing, eating within three hours of bed, this list is not all inclusive. \par -For treatment of reflux, possible options discussed including diet control, H2 blockers, PPIs, as well as coating motility agents discussed as treatment options. Timing of meals and proximity of last meal to sleep were discussed. If symptoms persist, a formal gastrointestinal evaluation is needed.\par \par Problem 5: Postnasal drip / allergy\par -Olopatadine nasal spray .06% 1 sniff each nostril BID\par -add Clarinex 5 mg QAM  \par \par Environmental measures for allergies were encouraged including mattress and pillow cover, air purifier, and environmental controls. \par \par Problem 6: Overweight\par -Weight loss, exercise, and diet control were discussed and are highly encouraged. Treatment options were given such as, aqua therapy, and contacting a nutritionist. Recommended to use the elliptical, stationary bike, less use of treadmill. Mindful eating was explained to the patient Obesity is associated with worsening asthma, shortness of breath, and potential for cardiac disease, diabetes, and other underlying medical conditions. \par \par Problem 7: (+) VARGHESE\par -recommended to get Dental Device\par -Recommending home sleep study.  variability in home sleep study will send for lab sleep study\par Sleep apnea is associated with adverse clinical consequences which an affect most organ systems. Cardiovascular disease risk includes arrhythmias, atrial fibrillation, hypertension, coronary artery disease, and stroke. Metabolic disorders include diabetes type 2, non-alcoholic fatty liver disease. Mood disorder especially depression; and cognitive decline especially in the elderly. Associations with chronic reflux/Danielle’s esophagus some but not all inclusive. \par -Reasons include arousal consistent with hypopnea; respiratory events most prominent in REM sleep or supine position; therefore sleep staging and body position are important for accurate diagnosis and estimation of AHI. \par \par problem 8: low vitamin D\par -Supplement 50,000 q 2 months\par \par Has been associated with asthma exacerbations and increased allergic symptoms. The goal based on recent information is maintaining levels between 50-70 and low normal is 30. Recommended 50,000 units every two weeks to once a month depending on the level.\par  \par Problem 9: Health Maintenance/COVID19 Precautions:\par - Clean your hands often. Wash your hands often with soap and water for at least 20 seconds, especially after blowing your nose, coughing, or sneezing, or having been in a public place.\par - If soap and water are not available, use a hand  that contains at least 60% alcohol.\par - To the extent possible, avoid touching high-touch surfaces in public places - elevator buttons, door handles, handrails, handshaking with people, etc. Use a tissue or your sleeve to cover your hand or finger if you must touch something.\par - Wash your hands after touching surfaces in public places.\par Immune Support Recommendations:\par -OTC Vitamin C 500mg BID \par -OTC Quercetin 250-500mg BID \par -OTC Zinc 75-100mg per day \par -OTC Melatonin 1or 2mg a night \par -OTC Vitamin D 1-4000mg per day \par -OTC Tonic Water 8oz per day\par \par Problem 10: Health maintenance\par -s/p COVID 19 vaccine (1 of 2) \par -recommended Neurologic evaluation ([place]) \par - Discussed Pneumovax and influenza  (completed)\par - Flu shot - 2020\par \par f/u in 4 months with full PFTs\par pt is encouraged to call or fax the office with any questions or concerns. \par Explained to the pt in full detail with demonstrations how to use the inhalers and inhaler hygiene. \par -Education provided to the PT regarding their visit and conditions. \par She is encouraged to call with any questions, concerns, or changes\par

## 2021-01-25 NOTE — ADDENDUM
[FreeTextEntry1] : Documented by Ruperto Retana acting as a scribe for Dr. Eddie Agudelo on 01/25/2021.\par \par All medical record entries made by the Scribe were at my, Dr. Eddie Agudelo's, direction and personally dictated by me on 01/25/2021 . I have reviewed the chart and agree that the record accurately reflects my personal performance of the history, physical exam, assessment and plan. I have also personally directed, reviewed, and agree with the discharge instructions. \par

## 2021-01-26 RX ORDER — MECLIZINE HYDROCHLORIDE 25 MG/1
25 TABLET ORAL
Qty: 30 | Refills: 0 | Status: COMPLETED | COMMUNITY
Start: 2020-10-21 | End: 2021-01-26

## 2021-01-26 RX ORDER — ATORVASTATIN CALCIUM 80 MG/1
TABLET, FILM COATED ORAL
Refills: 0 | Status: COMPLETED | COMMUNITY
End: 2021-01-26

## 2021-04-06 ENCOUNTER — LABORATORY RESULT (OUTPATIENT)
Age: 66
End: 2021-04-06

## 2021-04-06 ENCOUNTER — APPOINTMENT (OUTPATIENT)
Dept: CARDIOLOGY | Facility: CLINIC | Age: 66
End: 2021-04-06
Payer: MEDICARE

## 2021-04-06 ENCOUNTER — NON-APPOINTMENT (OUTPATIENT)
Age: 66
End: 2021-04-06

## 2021-04-06 VITALS
HEIGHT: 59 IN | SYSTOLIC BLOOD PRESSURE: 128 MMHG | TEMPERATURE: 98 F | BODY MASS INDEX: 30.04 KG/M2 | OXYGEN SATURATION: 98 % | HEART RATE: 67 BPM | DIASTOLIC BLOOD PRESSURE: 80 MMHG | RESPIRATION RATE: 16 BRPM | WEIGHT: 149 LBS

## 2021-04-06 PROCEDURE — 93000 ELECTROCARDIOGRAM COMPLETE: CPT

## 2021-04-06 PROCEDURE — 99214 OFFICE O/P EST MOD 30 MIN: CPT

## 2021-04-06 NOTE — PHYSICAL EXAM
[General Appearance - Well Developed] : well developed [Normal Appearance] : normal appearance [General Appearance - Well Nourished] : well nourished [Normal Conjunctiva] : the conjunctiva exhibited no abnormalities [Eyelids - No Xanthelasma] : the eyelids demonstrated no xanthelasmas [Normal Oral Mucosa] : normal oral mucosa [Normal Oropharynx] : normal oropharynx [Normal Jugular Venous V Waves Present] : normal jugular venous V waves present [] : no respiratory distress [Respiration, Rhythm And Depth] : normal respiratory rhythm and effort [Exaggerated Use Of Accessory Muscles For Inspiration] : no accessory muscle use [Auscultation Breath Sounds / Voice Sounds] : lungs were clear to auscultation bilaterally [Bowel Sounds] : normal bowel sounds [Abdomen Soft] : soft [Abdomen Tenderness] : non-tender [Abnormal Walk] : normal gait [Nail Clubbing] : no clubbing of the fingernails [Cyanosis, Localized] : no localized cyanosis [Skin Color & Pigmentation] : normal skin color and pigmentation [Skin Turgor] : normal skin turgor [Oriented To Time, Place, And Person] : oriented to person, place, and time [Impaired Insight] : insight and judgment were intact [Affect] : the affect was normal [5th Left ICS - MCL] : palpated at the 5th LICS in the midclavicular line [Normal] : normal [No Precordial Heave] : no precordial heave was noted [Normal Rate] : normal [Rhythm Regular] : regular [Normal S1] : normal S1 [Normal S2] : normal S2 [No Gallop] : no gallop heard [I] : a grade 1 [2+] : left 2+ [No Abnormalities] : the abdominal aorta was not enlarged and no bruit was heard [No Pitting Edema] : no pitting edema present

## 2021-04-06 NOTE — DISCUSSION/SUMMARY
[FreeTextEntry1] : Dr. Phillips-(PRIOR VISIT and PMH WITH Dr. Phillips): \par This is a 65-year-old female with past medical history significant for GERD, status post lumpectomy, status post appendectomy, hyperlipidemia, pulmonary nodules who comes in for cardiac consultation.  She denies palpitations, dizziness or syncope.  She does have chest discomfort in the evening.  This starts in the epigastric area and radiated up to her throat when she lays flat.  She also gives a history of ulcer disease.\par The patient had a normal exercise stress test January 25, 2021.\par She will schedule an echo Doppler examination to evaluate her murmur, left ventricular function, chamber size, and rule out hypertrophy.\par She will have blood work done prior to her next visit to check a lipid panel and SMA-20.\par \par She was born in [place] and has no history of rheumatic fever.  She does not drink excessive caffeine or alcohol.  She is taking Lipitor, but will contact us with the correct dosage, Prilosec 40 mg daily Prolia, and Pepcid 40 mg/day.\par Electrocardiogram done February while 2020 demonstrates normal sinus rhythm at a rate of 78 bpm is otherwise remarkable for left axis deviation \par The patient understands that aerobic exercises must be increased to 40 minutes 4 times per week. A detailed discussion of lifestyle modification was done today. The patient has a good understanding of the diagnosis, and treatment plan. Lifestyle modification was also outlined.

## 2021-04-06 NOTE — REVIEW OF SYSTEMS
negative Soft, non-tender, no hepatosplenomegaly, normal bowel sounds [Dyspnea on exertion] : dyspnea during exertion [Chest Pain] : chest pain [Negative] : Heme/Lymph

## 2021-04-06 NOTE — REASON FOR VISIT
[Follow-Up - Clinic] : a clinic follow-up of [Palpitations] : palpitations [FreeTextEntry1] : This is a 65 year year old female here today for follow up cardiac evaluation. \par She has a past medical history significant for  GERD, status post lumpectomy, status post appendectomy, hyperlipidemia, pulmonary nodules.\par \par Today she is feeling generally well and does not have any complaints at this time. \par She denies fever, chills, weight loss, malaise, rash, alteration bowel habits, weakness, abdominal  pain, bloating, changes in urination, visual disturbances, chest pain, headaches, dizziness, heart palpitations, recent episodes of syncope or falls at this time.\par \par

## 2021-04-06 NOTE — ASSESSMENT
[FreeTextEntry1] : This is a 65 year year old female here today for follow up cardiac evaluation. \par She has a past medical history significant for  GERD, status post lumpectomy, status post appendectomy, hyperlipidemia, pulmonary nodules.\par \par -Pt is stable from a cardiac standpoint.\par -She does state that she is feeling more tired and stressed since she is taking care of her mother who was recently discharged from the hospital.\par \par -Cardiac risk factors include HTN, HLD.\par \par BLOOD PRESSURE:\par -BP is well controlled in today's visit.\par \par BLOOD WORK:\par -Blood work was done 2/12/2020 which demonstrated cholesterol of 215 and LDL of 135. She is on Simvastatin 20mg PO DAILY. ASCVD Risk of 6.9%.\par -New blood work done today to evaluate lipid panel. \par \par CHOLESTEROL CONTROL:\par -Patient will continue the advised  TLC diet and to continue follow-up for treatment of hyperlipidemia and repeat blood testing with diet and exercise. I have discussed different exercises and the importance of maintenance of optimal body weight. The importance of staying within guidelines and recommendations was stressed to the patient today and they acknowledged that they understand this to me verbally.\par \par TESTING/REPORTS:\par -EKG done Apr 06, 2021 which demonstrated regular sinus rhythm with nonspecific ST-T wave changes, BPM of 67. \par -The patient had an echocardiogram done on 1/25/2021 demonstrated mild mitral and tricuspid regurgitation with normal left ventricular systolic function. \par -EF on echo reported to be 65%. \par -The patient had a normal exercise stress test done on 1/25/2021\par \par \par PLAN:\par -She will follow up with her pulmonologist.\par -She will continue TLC with proper diet.\par \par I have discussed the plan of care with Ms. APARNA NIX  and she  will follow up in 3 months. She is compliant with all of her medications.\par \par The patient understands that aerobic exercises must be increased to minutes 4 times/week and a detailed discussion of lifestyle modification was done today. \par The patient has a good understanding of the diagnosis, treatment plan and lifestyle modification. \par She will contact me at the office for any questions with their care or any changes in their health status.\par \par The plan of care was discussed with supervising physician, Dr. Phillips while present in the office at the time of the visit. \par \par Maria Guadalupe NP

## 2021-05-25 ENCOUNTER — APPOINTMENT (OUTPATIENT)
Dept: PULMONOLOGY | Facility: CLINIC | Age: 66
End: 2021-05-25

## 2021-07-12 ENCOUNTER — APPOINTMENT (OUTPATIENT)
Dept: CARDIOLOGY | Facility: CLINIC | Age: 66
End: 2021-07-12

## 2021-08-03 ENCOUNTER — RX RENEWAL (OUTPATIENT)
Age: 66
End: 2021-08-03

## 2021-08-15 ENCOUNTER — RX RENEWAL (OUTPATIENT)
Age: 66
End: 2021-08-15

## 2021-08-16 ENCOUNTER — RX RENEWAL (OUTPATIENT)
Age: 66
End: 2021-08-16

## 2021-09-21 LAB — SARS-COV-2 N GENE NPH QL NAA+PROBE: NOT DETECTED

## 2021-09-24 ENCOUNTER — APPOINTMENT (OUTPATIENT)
Dept: PULMONOLOGY | Facility: CLINIC | Age: 66
End: 2021-09-24
Payer: MEDICARE

## 2021-09-24 ENCOUNTER — NON-APPOINTMENT (OUTPATIENT)
Age: 66
End: 2021-09-24

## 2021-09-24 VITALS
BODY MASS INDEX: 32.12 KG/M2 | RESPIRATION RATE: 16 BRPM | DIASTOLIC BLOOD PRESSURE: 80 MMHG | HEART RATE: 69 BPM | SYSTOLIC BLOOD PRESSURE: 140 MMHG | WEIGHT: 153 LBS | OXYGEN SATURATION: 96 % | HEIGHT: 58 IN | TEMPERATURE: 96.4 F

## 2021-09-24 PROCEDURE — 99214 OFFICE O/P EST MOD 30 MIN: CPT | Mod: 25

## 2021-09-24 PROCEDURE — 99406 BEHAV CHNG SMOKING 3-10 MIN: CPT | Mod: 25

## 2021-09-24 PROCEDURE — 94010 BREATHING CAPACITY TEST: CPT

## 2021-09-24 RX ORDER — MONTELUKAST 10 MG/1
10 TABLET, FILM COATED ORAL
Qty: 90 | Refills: 0 | Status: DISCONTINUED | COMMUNITY
Start: 2021-01-25 | End: 2021-09-24

## 2021-09-24 RX ORDER — PREDNISONE 20 MG/1
20 TABLET ORAL
Qty: 7 | Refills: 0 | Status: DISCONTINUED | COMMUNITY
Start: 2021-07-06

## 2021-09-24 RX ORDER — BROMFENAC SODIUM 0.7 MG/ML
0.07 SOLUTION/ DROPS OPHTHALMIC
Qty: 3 | Refills: 0 | Status: DISCONTINUED | COMMUNITY
Start: 2021-08-03

## 2021-09-24 RX ORDER — HYDROCORTISONE 25 MG/G
2.5 CREAM TOPICAL
Qty: 30 | Refills: 0 | Status: DISCONTINUED | COMMUNITY
Start: 2021-06-15

## 2021-09-24 RX ORDER — KETOROLAC TROMETHAMINE 5 MG/ML
0.5 SOLUTION OPHTHALMIC
Qty: 5 | Refills: 0 | Status: DISCONTINUED | COMMUNITY
Start: 2021-08-05

## 2021-09-24 RX ORDER — OFLOXACIN 3 MG/ML
0.3 SOLUTION/ DROPS OPHTHALMIC
Qty: 5 | Refills: 0 | Status: DISCONTINUED | COMMUNITY
Start: 2021-08-03

## 2021-09-24 NOTE — PROCEDURE
[FreeTextEntry1] : Feno was unable to complet e; a normal value being less than 25. Fractional exhaled nitric oxide (FENO) is regarded as a simple, noninvasive method for assessing eosinophilic airway inflammation. Produced by a variety of cells within the lung, nitric oxide (NO) concentrations are generally low in healthy individuals. However, high concentrations of NO appear to be involved in nonspecific host defense mechanisms and chronic inflammatory  diseases such as asthma. The American Thoracic Society (ATS) therefore recommended using FENO to aid in the diagnosis and monitoring of eosinophilic airway inflammation and asthma, and for identifying steroid responsive individuals whose chronic respiratory symptoms may be caused by airway inflammation \par  \par PFT revealed normal flows, with a FEV1 of 2.02L, which is 131% of predicted, with a normal flow volume loop\par \par -Images and procedures reviewed in detail and discussed with patient. \par

## 2021-09-24 NOTE — ADDENDUM
[FreeTextEntry1] : Documented by Sarmad Borja acting as a scribe for Dr. Eddie Agudelo on (09/24/2021).\par \par All medical record entries made by the Scribe were at my, Dr. Eddie Agudelo's, direction and personally dictated by me on (09/24/2021). I have reviewed the chart and agree that the record accurately reflects my personal performance of the history, physical exam, assessment and plan. I have also personally directed, reviewed, and agree with the discharge instructions.\par

## 2021-09-24 NOTE — HISTORY OF PRESENT ILLNESS
[FreeTextEntry1] : Ms. Mccarthy is a 65 year old female with a history of abnormal chest CT, COPD, GERD, low vitamin D, nicotine addiction, overweight, PND and snoring presenting to the office today for a follow up visit. Her chief complaint is\par -she notes her energy level is good \par -She notes chest pain and tightness while going to bed which is increasing in frequency \par -She notes awaiting cardiology appointment with Dr. Rajendra Pihllips\par -She notes her weight is stable \par -she notes frequent palpitations \par -She notes smoking the past few weeks \par -she notes stressed\par -She notes intermittent wheezing \par - S/p Covid 19 vaccine (Moderna) x2 \par \par - patient denies any headaches, nausea, vomiting, fever, chills, sweats, palpitations, coughing, fatigue, diarrhea, constipation, dysphagia, myalgias, dizziness, leg swelling, leg pain, itchy eyes, itchy ears, heartburn, reflux or sour taste in the mouth

## 2021-09-24 NOTE — ASSESSMENT
[FreeTextEntry1] : Ms. Mccarthy is a 65 year old female born in [place] former smoker with hx of HLD, osteoporosis, arthritis, migraines, COPD, abnormal CT and vertigo who presents to the office today for a follow up visit. She is currently stable from a pulmonary perspective, except chest pain sx / anxiety\par \par Problem 1: Abnormal CT scan\par -most consistent with inflammation. ?Hypersensitivity pneumonitis or sarcoidosis ; lung Cancer @ [facility]\par -ACE level and hypersensitivity level - both normal (past)\par -Follow up CT scan (last 10/2020, next 10/2021)\par -PPD (-)\par \par CAT scans are the only radiological modality to identify abnormalities w/in the lungs with regards to nodules/masses/lymph nodes. Risks, benefits were reviewed in detail. The guidelines for abnormalities include follow up CT scans at various intervals which could range from 6 weeks to 1 year intervals. If there is a change for the worse then consideration for a biopsy will be considered if you are a candidate. Second opinion evaluation with a thoracic surgeon or an interventional radiologist could be offered. \par \par Problem 2: COPD at risk secondary to prior smoking hx. \par -COPD is a progressive disease and although it can’t be cured , appropriate management can slow its progression, reduce frequency and severity of exacerbations, and improve symptoms and the patient quality of life. Hospitalizations are the greatest contributor to the total COPD costs and account for up to 87% of total COPD related costs. Exacerbations are the main cause of admissions and subsequently account for the 40-75% of COPD costs. Inhaled maintenance therapy reduces the incidence of exacerbations in patients with stable COPD. Incorrect inhaler use and nonadherence are major obstacles to achieving COPD treatment goals. Many COPD patients have challenges (impaired inhalation, limited dexterity, reduced cognition: that limit their ability to correctly use their COPD treatment devices resulting in reduced symptom control. Of most importance is smoking cessation and early intervention with respiratory illnesses and contemplation for pulmonary rehab to enhance quality of life.\par  \par Problem 2A: Asthma ?active\par -continue Breo Ellipta 200 at 1 inhalation QHS QD\par -off Singulair 10 mg before bed - side effects\par -add Incruse Ellipta 1 puff QD\par  - Asthma is believed to be caused by inherited (genetic) and environmental factor, but its exact cause is unknown. Asthma may be triggered by allergens, lung infections, or irritants in the air. Asthma triggers are different for each person.\par -Inhaler technique reviewed as well as oral hygiene techniques reviewed with patient. Avoidance of cold air, extremes of temperature, rescue inhaler should be used before exercise. Order of medication reviewed with patient. Recommended use of a cool mist humidifier in the bedroom. \par \par Problem 2B: Cardiac - chest pain active\par -Recommended to complete a cardiac evaluation with Dr. Phillips (ASAP)\par \par Problem 3: Nicotine addiction ( cessation discussed 09/24/2021) \par -successful smoking cessation\par Discussed for five minutes with the patient the risks/associations with continued smoking including COPD, emphysema, shortness of breath, renal cancer, bladder cancer, stroke risk, cardiac disease, etc. Smoking cessation was discussed at length and highly encouraged. Various options to aid cessation was discussed including use of Chantix, Nicotrol, nicotine products, laser therapy, hypnosis, Wellbutrin, etc.\par  \par Problem 4: GERD - \par -continue Omeprazole 40 mg - pre-breakfast \par -continue Pepcid 40 mg QHS \par -Rule of 2's: avoid eating too much, eating too late, eating too spicy, or eating within 2 hours of sleeping\par -Things to avoid including overeating, spicy foods, tight clothing, eating within three hours of bed, this list is not all inclusive. \par -For treatment of reflux, possible options discussed including diet control, H2 blockers, PPIs, as well as coating motility agents discussed as treatment options. Timing of meals and proximity of last meal to sleep were discussed. If symptoms persist, a formal gastrointestinal evaluation is needed.\par \par Problem 5: Postnasal drip / allergy\par -Olopatadine nasal spray .06% 1 sniff each nostril BID\par -add Clarinex 5 mg QAM  \par \par Environmental measures for allergies were encouraged including mattress and pillow cover, air purifier, and environmental controls. \par \par Problem 6: Overweight\par -Weight loss, exercise, and diet control were discussed and are highly encouraged. Treatment options were given such as, aqua therapy, and contacting a nutritionist. Recommended to use the elliptical, stationary bike, less use of treadmill. Mindful eating was explained to the patient Obesity is associated with worsening asthma, shortness of breath, and potential for cardiac disease, diabetes, and other underlying medical conditions. \par \par Problem 7: (+) VARGHESE\par -recommended to get Dental Device\par -Recommending home sleep study.  variability in home sleep study will send for lab sleep study\par Sleep apnea is associated with adverse clinical consequences which an affect most organ systems. Cardiovascular disease risk includes arrhythmias, atrial fibrillation, hypertension, coronary artery disease, and stroke. Metabolic disorders include diabetes type 2, non-alcoholic fatty liver disease. Mood disorder especially depression; and cognitive decline especially in the elderly. Associations with chronic reflux/Danielle’s esophagus some but not all inclusive. \par -Reasons include arousal consistent with hypopnea; respiratory events most prominent in REM sleep or supine position; therefore sleep staging and body position are important for accurate diagnosis and estimation of AHI. \par \par problem 8: low vitamin D\par -Supplement 50,000 q 2 months\par \par Has been associated with asthma exacerbations and increased allergic symptoms. The goal based on recent information is maintaining levels between 50-70 and low normal is 30. Recommended 50,000 units every two weeks to once a month depending on the level.\par  \par Problem 9: Health Maintenance/COVID19 Precautions:\par - S/p Covid 19 vaccine (Moderna) x2 \par -Covid 19 vaccine discussed at length with patient; booster discussed and recommended to wait for vaccine containing new delta variant \par - Clean your hands often. Wash your hands often with soap and water for at least 20 seconds, especially after blowing your nose, coughing, or sneezing, or having been in a public place.\par - If soap and water are not available, use a hand  that contains at least 60% alcohol.\par - To the extent possible, avoid touching high-touch surfaces in public places - elevator buttons, door handles, handrails, handshaking with people, etc. Use a tissue or your sleeve to cover your hand or finger if you must touch something.\par - Wash your hands after touching surfaces in public places.\par Immune Support Recommendations:\par -OTC Vitamin C 500mg BID \par -OTC Quercetin 250-500mg BID \par -OTC Zinc 75-100mg per day \par -OTC Melatonin 1or 2mg a night \par -OTC Vitamin D 1-4000mg per day \par -OTC Tonic Water 8oz per day\par \par Problem 10: Health maintenance\par -s/p COVID 19 vaccine (1 of 2) \par -recommended Neurologic evaluation ([facility]) \par - Discussed Pneumovax and influenza  (completed)\par - Flu shot - 2020\par \par f/u in 4 months with full PFTs\par pt is encouraged to call or fax the office with any questions or concerns. \par Explained to the pt in full detail with demonstrations how to use the inhalers and inhaler hygiene. \par -Education provided to the PT regarding their visit and conditions. \par She is encouraged to call with any questions, concerns, or changes\par

## 2021-09-29 ENCOUNTER — APPOINTMENT (OUTPATIENT)
Dept: CARDIOLOGY | Facility: CLINIC | Age: 66
End: 2021-09-29
Payer: MEDICARE

## 2021-09-29 VITALS
HEART RATE: 61 BPM | OXYGEN SATURATION: 98 % | BODY MASS INDEX: 30.64 KG/M2 | RESPIRATION RATE: 16 BRPM | SYSTOLIC BLOOD PRESSURE: 128 MMHG | HEIGHT: 58 IN | DIASTOLIC BLOOD PRESSURE: 80 MMHG | TEMPERATURE: 97.7 F | WEIGHT: 146 LBS

## 2021-09-29 PROCEDURE — 99214 OFFICE O/P EST MOD 30 MIN: CPT | Mod: 25

## 2021-09-29 PROCEDURE — 93000 ELECTROCARDIOGRAM COMPLETE: CPT

## 2021-09-29 NOTE — CARDIOLOGY SUMMARY
[___] : [unfilled] [de-identified] : 9/29/2021 [de-identified] : 1/25/2021 [de-identified] : 1/25/2021

## 2021-09-29 NOTE — REASON FOR VISIT
[Hyperlipidemia] : hyperlipidemia [Follow-Up - Clinic] : a clinic follow-up of [Palpitations] : palpitations [FreeTextEntry1] : murmur

## 2021-09-29 NOTE — PHYSICAL EXAM
[Well Developed] : well developed [Well Nourished] : well nourished [No Acute Distress] : no acute distress [Normal Venous Pressure] : normal venous pressure [No Carotid Bruit] : no carotid bruit [Normal S1, S2] : normal S1, S2 [No Murmur] : no murmur [No Rub] : no rub [Clear Lung Fields] : clear lung fields [Good Air Entry] : good air entry [No Respiratory Distress] : no respiratory distress  [Soft] : abdomen soft [Non Tender] : non-tender [No Masses/organomegaly] : no masses/organomegaly [Normal Bowel Sounds] : normal bowel sounds [Normal Gait] : normal gait [No Edema] : no edema [No Cyanosis] : no cyanosis [No Clubbing] : no clubbing [No Varicosities] : no varicosities [No Rash] : no rash [No Skin Lesions] : no skin lesions [Moves all extremities] : moves all extremities [No Focal Deficits] : no focal deficits [Normal Speech] : normal speech [Alert and Oriented] : alert and oriented [Normal memory] : normal memory [General Appearance - Well Developed] : well developed [Normal Appearance] : normal appearance [General Appearance - Well Nourished] : well nourished [Normal Conjunctiva] : the conjunctiva exhibited no abnormalities [Eyelids - No Xanthelasma] : the eyelids demonstrated no xanthelasmas [Normal Oral Mucosa] : normal oral mucosa [Normal Oropharynx] : normal oropharynx [Normal Jugular Venous V Waves Present] : normal jugular venous V waves present [] : no respiratory distress [Respiration, Rhythm And Depth] : normal respiratory rhythm and effort [Exaggerated Use Of Accessory Muscles For Inspiration] : no accessory muscle use [Auscultation Breath Sounds / Voice Sounds] : lungs were clear to auscultation bilaterally [Bowel Sounds] : normal bowel sounds [Abdomen Soft] : soft [Abdomen Tenderness] : non-tender [Abnormal Walk] : normal gait [Nail Clubbing] : no clubbing of the fingernails [Cyanosis, Localized] : no localized cyanosis [Skin Color & Pigmentation] : normal skin color and pigmentation [Skin Turgor] : normal skin turgor [Oriented To Time, Place, And Person] : oriented to person, place, and time [Impaired Insight] : insight and judgment were intact [Affect] : the affect was normal [5th Left ICS - MCL] : palpated at the 5th LICS in the midclavicular line [Normal] : normal [No Precordial Heave] : no precordial heave was noted [Normal Rate] : normal [Rhythm Regular] : regular [Normal S1] : normal S1 [Normal S2] : normal S2 [No Gallop] : no gallop heard [I] : a grade 1 [2+] : left 2+ [No Abnormalities] : the abdominal aorta was not enlarged and no bruit was heard [No Pitting Edema] : no pitting edema present

## 2021-09-29 NOTE — DISCUSSION/SUMMARY
[FreeTextEntry1] : Dr. Phillips-(PRIOR VISIT and PMH WITH Dr. Phillips): \par This is a 65-year-old female with past medical history significant for GERD, status post lumpectomy, status post appendectomy, hyperlipidemia, pulmonary nodules who comes in for cardiac consultation.  She denies palpitations, dizziness or syncope.  She does have chest discomfort in the evening.  This starts in the epigastric area and radiated up to her throat when she lays flat.  She also gives a history of ulcer disease.\par \par The patient had a normal exercise stress test January 25, 2021.\par \par She will schedule an echo Doppler examination to evaluate her murmur, left ventricular function, chamber size, and rule out hypertrophy.\par \par She will have blood work done prior to her next visit to check a lipid panel and SMA-20.\par \par She was born in [place] and has no history of rheumatic fever.  She does not drink excessive caffeine or alcohol.  \par \par She is taking Lipitor, but will contact us with the correct dosage, Prilosec 40 mg daily Prolia, and Pepcid 40 mg/day.\par \par Electrocardiogram done February while 2020 demonstrates normal sinus rhythm at a rate of 78 bpm is otherwise remarkable for left axis deviation.\par \par The patient understands that aerobic exercises must be increased to 40 minutes 4 times per week. A detailed discussion of lifestyle modification was done today. The patient has a good understanding of the diagnosis, and treatment plan. Lifestyle modification was also outlined.

## 2022-01-28 ENCOUNTER — APPOINTMENT (OUTPATIENT)
Dept: PULMONOLOGY | Facility: CLINIC | Age: 67
End: 2022-01-28

## 2022-02-08 ENCOUNTER — APPOINTMENT (OUTPATIENT)
Dept: OPHTHALMOLOGY | Facility: CLINIC | Age: 67
End: 2022-02-08
Payer: MEDICARE

## 2022-02-08 ENCOUNTER — NON-APPOINTMENT (OUTPATIENT)
Age: 67
End: 2022-02-08

## 2022-02-08 PROCEDURE — 92134 CPTRZ OPH DX IMG PST SGM RTA: CPT

## 2022-02-08 PROCEDURE — 92004 COMPRE OPH EXAM NEW PT 1/>: CPT

## 2022-03-14 ENCOUNTER — APPOINTMENT (OUTPATIENT)
Dept: CARDIOLOGY | Facility: CLINIC | Age: 67
End: 2022-03-14

## 2022-12-08 ENCOUNTER — NON-APPOINTMENT (OUTPATIENT)
Age: 67
End: 2022-12-08

## 2022-12-08 ENCOUNTER — APPOINTMENT (OUTPATIENT)
Dept: PULMONOLOGY | Facility: CLINIC | Age: 67
End: 2022-12-08

## 2022-12-08 VITALS
SYSTOLIC BLOOD PRESSURE: 130 MMHG | RESPIRATION RATE: 16 BRPM | HEART RATE: 74 BPM | WEIGHT: 156 LBS | HEIGHT: 60 IN | TEMPERATURE: 97.8 F | BODY MASS INDEX: 30.63 KG/M2 | OXYGEN SATURATION: 98 % | DIASTOLIC BLOOD PRESSURE: 80 MMHG

## 2022-12-08 DIAGNOSIS — R05.9 COUGH, UNSPECIFIED: ICD-10-CM

## 2022-12-08 PROCEDURE — 94010 BREATHING CAPACITY TEST: CPT

## 2022-12-08 PROCEDURE — 99214 OFFICE O/P EST MOD 30 MIN: CPT | Mod: 25

## 2022-12-08 RX ORDER — AZELASTINE HYDROCHLORIDE 137 UG/1
0.1 SPRAY, METERED NASAL TWICE DAILY
Qty: 1 | Refills: 6 | Status: ACTIVE | COMMUNITY
Start: 2022-12-08 | End: 1900-01-01

## 2022-12-08 RX ORDER — ALBUTEROL SULFATE 2.5 MG/3ML
(2.5 MG/3ML) SOLUTION RESPIRATORY (INHALATION) EVERY 6 HOURS
Qty: 1 | Refills: 2 | Status: ACTIVE | COMMUNITY
Start: 2022-12-08 | End: 1900-01-01

## 2022-12-09 ENCOUNTER — NON-APPOINTMENT (OUTPATIENT)
Age: 67
End: 2022-12-09

## 2022-12-09 LAB
RAPID RVP RESULT: NOT DETECTED
SARS-COV-2 RNA PNL RESP NAA+PROBE: NOT DETECTED

## 2022-12-28 NOTE — HISTORY OF PRESENT ILLNESS
[TextBox_4] : Ms. Mccarthy is a 67 year old female with a history of abnormal chest CT, COPD, GERD, low vitamin D, nicotine addiction, overweight, PND and snoring who now presents to the office for sick visit. \par \par Her chief complaint is cough/wheezing/SOB on exertion.\par \par Patient states she had COVID infection 2 month ago. She states since COVID infection her breathing has not been same. Patient states she has only been taking Breo inhalers. \par Patient states for past 1 week, she is experiencing productive cough with yellow mucus, runny nose, post nasal drip, wheezing at night, and SOB with exertion. \par Patient states she needs a nebulizer device. \par \par Patient states her  is very sick and she hasn't had time to take care of herself. \par \par Patient denies fever, chills, SOB @ rest, nasal congestion, or heartburn/reflux. \par \par

## 2022-12-28 NOTE — ASSESSMENT
[FreeTextEntry1] : Ms. Mccarthy is a 67 year old female with a history of abnormal chest CT, COPD, GERD, low vitamin D, nicotine addiction, overweight, PND and snoring who now presents to the office for sick visit. \par \par Her chief complaint is cough/wheezing/SOB on exertion.\par \par 1.Cough/Wheezing\par -r/t viral infection & asthma exacerbation\par -add RVP (list of [facility] labs given at checkout)\par -add Prednisone 20 mg X 7 days, then 10 mg X 7 days\par \par 2. Asthma\par -add Albuterol via nebulizer Q6H\par -continue Breo Ellipta 200 at 1 inhalation QHS QD\par -restart Incruse Ellipta 1 puff QD\par \par 3. Allergies/PND\par -add Azelastine nasal spray 2 spray BID\par \par Patient to follow up with Dr. Agudelo as scheduled.\par Patient to call with further questions and concerns.\par Patient verbalizes understanding of care plan and is agreeable.\par

## 2022-12-28 NOTE — DISCUSSION/SUMMARY
[FreeTextEntry1] : SPI performed in office show\par FEV: 117\par FEV1/FVC Ratio: 106\par [id number]%: 92\par \par

## 2023-01-18 ENCOUNTER — APPOINTMENT (OUTPATIENT)
Dept: PULMONOLOGY | Facility: CLINIC | Age: 68
End: 2023-01-18
Payer: MEDICARE

## 2023-01-18 ENCOUNTER — NON-APPOINTMENT (OUTPATIENT)
Age: 68
End: 2023-01-18

## 2023-01-18 VITALS
WEIGHT: 156 LBS | SYSTOLIC BLOOD PRESSURE: 140 MMHG | TEMPERATURE: 96.1 F | HEART RATE: 89 BPM | OXYGEN SATURATION: 98 % | DIASTOLIC BLOOD PRESSURE: 82 MMHG | RESPIRATION RATE: 16 BRPM | BODY MASS INDEX: 30.63 KG/M2 | HEIGHT: 60 IN

## 2023-01-18 PROCEDURE — 94729 DIFFUSING CAPACITY: CPT

## 2023-01-18 PROCEDURE — 99214 OFFICE O/P EST MOD 30 MIN: CPT | Mod: 25

## 2023-01-18 PROCEDURE — 94727 GAS DIL/WSHOT DETER LNG VOL: CPT

## 2023-01-18 PROCEDURE — 99406 BEHAV CHNG SMOKING 3-10 MIN: CPT | Mod: 25

## 2023-01-18 PROCEDURE — 94010 BREATHING CAPACITY TEST: CPT

## 2023-01-18 RX ORDER — PREDNISONE 10 MG/1
10 TABLET ORAL
Qty: 21 | Refills: 0 | Status: DISCONTINUED | COMMUNITY
Start: 2022-12-08 | End: 2023-01-18

## 2023-01-18 NOTE — HISTORY OF PRESENT ILLNESS
[FreeTextEntry1] : Ms. Mccarthy is a 67 year old female with a history of abnormal chest CT, COPD, GERD, low vitamin D, nicotine addiction, overweight, PND and snoring presenting to the office today for a follow up visit. Her chief complaint is\par - she has not been doing well emotionally \par -she notes SOB, especially when she is angry \par -she notes globus sensation and pain in her throat after Covid-19 (10/2022)\par -she notes she went to urgent care for Covid and was directed to visit a hospital \par -she notes she has been wheezing after Covid-19 \par -she notes she has pain but she is not sure if its her heart or chest \par -She denies any visual issues, headaches, nausea, vomiting, fever, chills, sweats, chest pains, chest pressure, diarrhea, constipation, dysphagia, myalgia, dizziness, leg swelling, leg pain, itchy eyes, itchy ears, heartburn, reflux, or sour taste in the mouth.

## 2023-01-18 NOTE — PROCEDURE
[FreeTextEntry1] : PFT reveals normal flows, with an FEV1 of   2.03L, which is  124% of predicted, with normal flow volume loop \par \par feno; unable to perform

## 2023-01-18 NOTE — ADDENDUM
[FreeTextEntry1] : Documented by Masoud Bender as a scribe for Dr. Eddie Agudelo on 01/18/2023 \par \par All medical record entries made by the Scribe were at my, Dr. Eddie Agudelo's, direction and personally dictated by me on 01/18/2023 . I have reviewed the chart and agree that the record accurately reflects my personal performance of the history, physical exam, assessment and plan. I have also personally directed, reviewed, and agree with the discharge instructions.

## 2023-01-18 NOTE — ASSESSMENT
[FreeTextEntry1] : Ms. Mccarthy is a 67 year old female born in [place] former smoker with hx of HLD, osteoporosis, arthritis, migraines, COPD, abnormal CT and vertigo Covid-19 10/2022 (still smoking) who presents to the office today for a follow up visit. She is residual covid-19 induced asthma/ SOB, ? cardiac/PE\par \par Problem 1: Abnormal CT scan (overdue)\par -most consistent with inflammation. ?Hypersensitivity pneumonitis or sarcoidosis ; lung Cancer @ [facility]\par -ACE level and hypersensitivity level - both normal (past)\par -Follow up CT scan (last 10/2020, next 10/2021)\par -PPD (-)\par \par CAT scans are the only radiological modality to identify abnormalities w/in the lungs with regards to nodules/masses/lymph nodes. Risks, benefits were reviewed in detail. The guidelines for abnormalities include follow up CT scans at various intervals which could range from 6 weeks to 1 year intervals. If there is a change for the worse then consideration for a biopsy will be considered if you are a candidate. Second opinion evaluation with a thoracic surgeon or an interventional radiologist could be offered. \par \par Problem 2: COPD at risk secondary to prior smoking hx. \par -Add Daliresp (250 or 500) mg q Day \par -COPD is a progressive disease and although it can’t be cured , appropriate management can slow its progression, reduce frequency and severity of exacerbations, and improve symptoms and the patient quality of life. Hospitalizations are the greatest contributor to the total COPD costs and account for up to 87% of total COPD related costs. Exacerbations are the main cause of admissions and subsequently account for the 40-75% of COPD costs. Inhaled maintenance therapy reduces the incidence of exacerbations in patients with stable COPD. Incorrect inhaler use and nonadherence are major obstacles to achieving COPD treatment goals. Many COPD patients have challenges (impaired inhalation, limited dexterity, reduced cognition: that limit their ability to correctly use their COPD treatment devices resulting in reduced symptom control. Of most importance is smoking cessation and early intervention with respiratory illnesses and contemplation for pulmonary rehab to enhance quality of life.\par  \par Problem 2A: Asthma ?active past Covid-19 10/2022\par -continue Breo Ellipta 200 at 1 inhalation  QD/ -add Spiriva at 2 inhalations QAM \par -off Singulair 10 mg before bed - side effects\par -add Albuterol via nebulizer, Q6H \par  - Asthma is believed to be caused by inherited (genetic) and environmental factor, but its exact cause is unknown. Asthma may be triggered by allergens, lung infections, or irritants in the air. Asthma triggers are different for each person.\par -Inhaler technique reviewed as well as oral hygiene techniques reviewed with patient. Avoidance of cold air, extremes of temperature, rescue inhaler should be used before exercise. Order of medication reviewed with patient. Recommended use of a cool mist humidifier in the bedroom. \par \par Problem 2B: Cardiac - chest pain active\par -Recommended to complete a cardiac evaluation with Dr. Phillips ; cardiac CRP/ ESR/ D-dimer, CBC\par \par Problem 3: Nicotine addiction ( cessation discussed 01/18/2023)\par -unsuccessful smoking cessation\par Discussed for five minutes with the patient the risks/associations with continued smoking including COPD, emphysema, shortness of breath, renal cancer, bladder cancer, stroke risk, cardiac disease, etc. Smoking cessation was discussed at length and highly encouraged. Various options to aid cessation was discussed including use of Chantix, Nicotrol, nicotine products, laser therapy, hypnosis, Wellbutrin, etc.\par  \par Problem 4: GERD - \par -continue Omeprazole 40 mg - pre-breakfast \par -continue Pepcid 40 mg QHS \par -Rule of 2's: avoid eating too much, eating too late, eating too spicy, or eating within 2 hours of sleeping\par -Things to avoid including overeating, spicy foods, tight clothing, eating within three hours of bed, this list is not all inclusive. \par -For treatment of reflux, possible options discussed including diet control, H2 blockers, PPIs, as well as coating motility agents discussed as treatment options. Timing of meals and proximity of last meal to sleep were discussed. If symptoms persist, a formal gastrointestinal evaluation is needed.\par \par Problem 5: Postnasal drip / allergy\par -Olopatadine nasal spray .06% 1 sniff each nostril BID\par -add Clarinex 5 mg QAM  \par \par Environmental measures for allergies were encouraged including mattress and pillow cover, air purifier, and environmental controls. \par \par Problem 6: Overweight\par -Weight loss, exercise, and diet control were discussed and are highly encouraged. Treatment options were given such as, aqua therapy, and contacting a nutritionist. Recommended to use the elliptical, stationary bike, less use of treadmill. Mindful eating was explained to the patient Obesity is associated with worsening asthma, shortness of breath, and potential for cardiac disease, diabetes, and other underlying medical conditions. \par \par Problem 7: (+) VARGHESE\par -recommended to get Dental Device\par -Recommending home sleep study.  variability in home sleep study will send for lab sleep study\par Sleep apnea is associated with adverse clinical consequences which an affect most organ systems. Cardiovascular disease risk includes arrhythmias, atrial fibrillation, hypertension, coronary artery disease, and stroke. Metabolic disorders include diabetes type 2, non-alcoholic fatty liver disease. Mood disorder especially depression; and cognitive decline especially in the elderly. Associations with chronic reflux/Danielle’s esophagus some but not all inclusive. \par -Reasons include arousal consistent with hypopnea; respiratory events most prominent in REM sleep or supine position; therefore sleep staging and body position are important for accurate diagnosis and estimation of AHI. \par \par problem 8: low vitamin D\par -Supplement 50,000 q 2 months\par \par Has been associated with asthma exacerbations and increased allergic symptoms. The goal based on recent information is maintaining levels between 50-70 and low normal is 30. Recommended 50,000 units every two weeks to once a month depending on the level.\par  \par Problem 9: Health Maintenance/COVID19 Precautions:\par - S/p Covid 19 vaccine (Moderna) x2 \par -Covid 19 vaccine discussed at length with patient; booster discussed and recommended to wait for vaccine containing new delta variant \par - Clean your hands often. Wash your hands often with soap and water for at least 20 seconds, especially after blowing your nose, coughing, or sneezing, or having been in a public place.\par - If soap and water are not available, use a hand  that contains at least 60% alcohol.\par - To the extent possible, avoid touching high-touch surfaces in public places - elevator buttons, door handles, handrails, handshaking with people, etc. Use a tissue or your sleeve to cover your hand or finger if you must touch something.\par - Wash your hands after touching surfaces in public places.\par Immune Support Recommendations:\par -OTC Vitamin C 500mg BID \par -OTC Quercetin 250-500mg BID \par -OTC Zinc 75-100mg per day \par -OTC Melatonin 1or 2mg a night \par -OTC Vitamin D 1-4000mg per day \par -OTC Tonic Water 8oz per day\par \par Problem 10: Health maintenance\par -s/p COVID 19 vaccine (1 of 2) \par -recommended Neurologic evaluation ([facility]) \par - Discussed Pneumovax and influenza  (completed)\par - Flu shot - 2020\par \par f/u in 4 months with full PFTs\par pt is encouraged to call or fax the office with any questions or concerns. \par Explained to the pt in full detail with demonstrations how to use the inhalers and inhaler hygiene. \par -Education provided to the PT regarding their visit and conditions. \par She is encouraged to call with any questions, concerns, or changes\par

## 2023-01-30 LAB
BASOPHILS # BLD AUTO: 0.15 K/UL
BASOPHILS NFR BLD AUTO: 2 %
CRP SERPL HS-MCNC: 8.56 MG/L
EOSINOPHIL # BLD AUTO: 0.17 K/UL
EOSINOPHIL NFR BLD AUTO: 2.2 %
ERYTHROCYTE [SEDIMENTATION RATE] IN BLOOD BY WESTERGREN METHOD: 7 MM/HR
HCT VFR BLD CALC: 40.5 %
HGB BLD-MCNC: 13.1 G/DL
IMM GRANULOCYTES NFR BLD AUTO: 0.3 %
LYMPHOCYTES # BLD AUTO: 2.31 K/UL
LYMPHOCYTES NFR BLD AUTO: 30.5 %
MAN DIFF?: NORMAL
MCHC RBC-ENTMCNC: 28.7 PG
MCHC RBC-ENTMCNC: 32.3 GM/DL
MCV RBC AUTO: 88.6 FL
MONOCYTES # BLD AUTO: 0.51 K/UL
MONOCYTES NFR BLD AUTO: 6.7 %
NEUTROPHILS # BLD AUTO: 4.42 K/UL
NEUTROPHILS NFR BLD AUTO: 58.3 %
PLATELET # BLD AUTO: 305 K/UL
RBC # BLD: 4.57 M/UL
RBC # FLD: 13.3 %
WBC # FLD AUTO: 7.58 K/UL

## 2023-01-31 ENCOUNTER — APPOINTMENT (OUTPATIENT)
Dept: CT IMAGING | Facility: CLINIC | Age: 68
End: 2023-01-31
Payer: MEDICARE

## 2023-01-31 ENCOUNTER — OUTPATIENT (OUTPATIENT)
Dept: OUTPATIENT SERVICES | Facility: HOSPITAL | Age: 68
LOS: 1 days | End: 2023-01-31
Payer: MEDICARE

## 2023-01-31 DIAGNOSIS — R93.89 ABNORMAL FINDINGS ON DIAGNOSTIC IMAGING OF OTHER SPECIFIED BODY STRUCTURES: ICD-10-CM

## 2023-01-31 LAB — DEPRECATED D DIMER PPP IA-ACNC: 152 NG/ML DDU

## 2023-01-31 PROCEDURE — 71250 CT THORAX DX C-: CPT

## 2023-01-31 PROCEDURE — 71250 CT THORAX DX C-: CPT | Mod: 26,MH

## 2023-02-02 ENCOUNTER — NON-APPOINTMENT (OUTPATIENT)
Age: 68
End: 2023-02-02

## 2023-02-03 ENCOUNTER — OUTPATIENT (OUTPATIENT)
Dept: OUTPATIENT SERVICES | Facility: HOSPITAL | Age: 68
LOS: 1 days | End: 2023-02-03
Payer: MEDICARE

## 2023-02-03 ENCOUNTER — APPOINTMENT (OUTPATIENT)
Dept: RADIOLOGY | Facility: CLINIC | Age: 68
End: 2023-02-03
Payer: MEDICARE

## 2023-02-03 ENCOUNTER — APPOINTMENT (OUTPATIENT)
Dept: MAMMOGRAPHY | Facility: CLINIC | Age: 68
End: 2023-02-03
Payer: MEDICARE

## 2023-02-03 ENCOUNTER — APPOINTMENT (OUTPATIENT)
Dept: ULTRASOUND IMAGING | Facility: CLINIC | Age: 68
End: 2023-02-03
Payer: MEDICARE

## 2023-02-03 DIAGNOSIS — Z12.31 ENCOUNTER FOR SCREENING MAMMOGRAM FOR MALIGNANT NEOPLASM OF BREAST: ICD-10-CM

## 2023-02-03 PROCEDURE — 76882 US LMTD JT/FCL EVL NVASC XTR: CPT | Mod: 26,RT

## 2023-02-03 PROCEDURE — 77080 DXA BONE DENSITY AXIAL: CPT

## 2023-02-03 PROCEDURE — 77063 BREAST TOMOSYNTHESIS BI: CPT | Mod: 26

## 2023-02-03 PROCEDURE — 77067 SCR MAMMO BI INCL CAD: CPT | Mod: 26

## 2023-02-03 PROCEDURE — 77080 DXA BONE DENSITY AXIAL: CPT | Mod: 26

## 2023-02-03 PROCEDURE — 76882 US LMTD JT/FCL EVL NVASC XTR: CPT

## 2023-02-03 PROCEDURE — 77067 SCR MAMMO BI INCL CAD: CPT

## 2023-02-03 PROCEDURE — 77063 BREAST TOMOSYNTHESIS BI: CPT

## 2023-02-06 ENCOUNTER — LABORATORY RESULT (OUTPATIENT)
Age: 68
End: 2023-02-06

## 2023-02-06 ENCOUNTER — APPOINTMENT (OUTPATIENT)
Dept: CARDIOLOGY | Facility: CLINIC | Age: 68
End: 2023-02-06
Payer: MEDICARE

## 2023-02-06 ENCOUNTER — NON-APPOINTMENT (OUTPATIENT)
Age: 68
End: 2023-02-06

## 2023-02-06 VITALS
TEMPERATURE: 98.3 F | DIASTOLIC BLOOD PRESSURE: 82 MMHG | BODY MASS INDEX: 33.66 KG/M2 | RESPIRATION RATE: 16 BRPM | OXYGEN SATURATION: 96 % | HEIGHT: 57 IN | HEART RATE: 98 BPM | SYSTOLIC BLOOD PRESSURE: 138 MMHG | WEIGHT: 156 LBS

## 2023-02-06 PROCEDURE — 99214 OFFICE O/P EST MOD 30 MIN: CPT | Mod: 25

## 2023-02-06 PROCEDURE — 93000 ELECTROCARDIOGRAM COMPLETE: CPT

## 2023-02-06 NOTE — ASSESSMENT
[FreeTextEntry1] : This is a 67 year year old female here today for follow up cardiac evaluation. \par She has a past medical history significant for  GERD, status post lumpectomy, status post appendectomy, hyperlipidemia, pulmonary nodules.\par \par Today she is feeling generally well and does not have any complaints at this time.  She had recent follow-up with her pulmonary doctor, Dr. Agudelo's office where he did blood work and she had an elevated C-reactive protein of 8.56.  She also had a CAT scan of her lungs which demonstrated some subjectively mild amount of coronary calcified plaque.  Her LDL goal should be 70 or less.  She would like to make a note that she recently had COVID and is recovering.  She states that after she was diagnosed with COVID she followed up with her pulmonologist and everything was stable however she is having chest pain that comes and goes, she states it is located in the middle of her chest and that she has dyspnea on exertion/short of breath especially when laying down/while she reads (she reads books and listens to audio tapes).  At the time of her visit today she does not have any chest pain but states it occurs at random times.\par \par -She is currently on simvastatin 20 mg for cholesterol management.\par \par She is planning on following up with her gastro doctor for management of her reflux. \par \par -Pt is stable from a cardiac standpoint.\par \par -She does state that she is feeling more tired and stressed since she is taking care of her mother and her  who was recently discharged from the hospital.\par \par -She was born in [place] and has no history of rheumatic fever.  She does not drink excessive caffeine or alcohol.  \par \par BLOOD PRESSURE:\par -BP is elevated on today's exam.  She states that she has been noticing elevated readings as of lately.\par \par BLOOD WORK:\par -New blood work was done 02/06/2023  to evaluate lipid profile, CBC, BMP, hepatic function, A1C and TSH. \par \par CHOLESTEROL CONTROL:\par -Patient will continue the advised  TLC diet and to continue follow-up for treatment of hyperlipidemia and repeat blood testing with diet and exercise. I have discussed different exercises and the importance of maintenance of optimal body weight. The importance of staying within guidelines and recommendations was stressed to the patient today and they acknowledged that they understand this to me verbally.\par \par TESTING/REPORTS:\par -EKG done 02/06/2023 which demonstrated regular sinus rhythm with nonspecific ST-T wave changes BPM of 98.\par \par -EKG done 9/29/2021 which demonstrated regular sinus rhythm with nonspecific ST-T wave changes, BPM of 63. \par \par -The patient had an echocardiogram done on 1/25/2021 demonstrated mild mitral and tricuspid regurgitation with normal left ventricular systolic function. \par -EF on echo reported to be 65%. \par \par -The patient had a normal exercise stress test done on 1/25/2021\par \par PLAN:\par -The patient will schedule a CCTA with [facility] to further evaluate her coronary artery anatomy.\par -The patient will schedule an Echo Doppler examination to evaluate murmur, left ventricular function, chamber size, and rule out hypertrophy. \par -She will start telmisartan 40 mg daily for better blood pressure control.\par -She will continue with her current medications and will contact the office if she is having any complaints between now and their next follow up appointment.\par -Based off of today's blood work her LDL should be less than 70 since she does have coronary artery calcification present.  She may benefit from switching her lipid-lowering therapy.  We will discuss this after we review the results of today's blood work\par -She will follow up with her GI doctor and pulmonologist routinely.\par -She will continue TLC with proper diet.\par \par I have discussed the plan of care with Ms. APARNA NIX  and she  will follow up in 3 months. She is compliant with all of her medications.\par \par The patient understands that aerobic exercises must be increased to minutes 4 times/week and a detailed discussion of lifestyle modification was done today. \par The patient has a good understanding of the diagnosis, treatment plan and lifestyle modification. \par She will contact me at the office for any questions with their care or any changes in their health status.\par \par The plan of care was discussed with supervising physician, Dr. Phillips while present in the office at the time of the visit. \par \par Maria Guadalupe [place]

## 2023-02-06 NOTE — CARDIOLOGY SUMMARY
[___] : [unfilled] [de-identified] : 9/29/2021 [de-identified] : 1/25/2021 [de-identified] : 1/25/2021

## 2023-02-09 ENCOUNTER — NON-APPOINTMENT (OUTPATIENT)
Age: 68
End: 2023-02-09

## 2023-02-24 ENCOUNTER — APPOINTMENT (OUTPATIENT)
Dept: PULMONOLOGY | Facility: CLINIC | Age: 68
End: 2023-02-24

## 2023-03-09 ENCOUNTER — OFFICE (OUTPATIENT)
Dept: URBAN - METROPOLITAN AREA CLINIC 32 | Facility: CLINIC | Age: 68
Setting detail: OPHTHALMOLOGY
End: 2023-03-09
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
## 2023-03-09 PROCEDURE — NO SHOW FE NO SHOW FEE: Performed by: OPHTHALMOLOGY

## 2023-03-17 NOTE — ASSESSMENT
[FreeTextEntry1] : This is a 65 year year old female here today for follow up cardiac evaluation. \par She has a past medical history significant for  GERD, status post lumpectomy, status post appendectomy, hyperlipidemia, pulmonary nodules.\par \par Today she is feeling generally well and does not have any complaints at this time. She is planning on following up with her gastro doctor for management of her reflux. \par \par -Pt is stable from a cardiac standpoint.\par \par -She does state that she is feeling more tired and stressed since she is taking care of her mother and her  who was recently discharged from the hospital.\par \par -She was born in [place] and has no history of rheumatic fever.  She does not drink excessive caffeine or alcohol.  \par \par BLOOD PRESSURE:\par -BP is well controlled in today's visit.\par \par BLOOD WORK:\par -New blood work was done today, 09/29/2021  to evaluate lipid profile, CBC, BMP, hepatic function, A1C and TSH. \par -She is on Simvastatin 20mg PO DAILY. ASCVD Risk of 6.9%.\par \par CHOLESTEROL CONTROL:\par -Patient will continue the advised  TLC diet and to continue follow-up for treatment of hyperlipidemia and repeat blood testing with diet and exercise. I have discussed different exercises and the importance of maintenance of optimal body weight. The importance of staying within guidelines and recommendations was stressed to the patient today and they acknowledged that they understand this to me verbally.\par \par TESTING/REPORTS:\par -EKG done 9/29/2021 which demonstrated regular sinus rhythm with nonspecific ST-T wave changes, BPM of 63. \par \par -The patient had an echocardiogram done on 1/25/2021 demonstrated mild mitral and tricuspid regurgitation with normal left ventricular systolic function. \par -EF on echo reported to be 65%. \par \par -The patient had a normal exercise stress test done on 1/25/2021\par \par PLAN:\par -She will continue with her usual medications and will contact the office if she is having any complaints between now and their next follow up appointment.\par -She will follow up with her GI doctor.\par -She will continue TLC with proper diet.\par \par I have discussed the plan of care with Ms. APARNA NIX  and she  will follow up in 3 months. She is compliant with all of her medications.\par \par The patient understands that aerobic exercises must be increased to minutes 4 times/week and a detailed discussion of lifestyle modification was done today. \par The patient has a good understanding of the diagnosis, treatment plan and lifestyle modification. \par She will contact me at the office for any questions with their care or any changes in their health status.\par \par The plan of care was discussed with supervising physician, Dr. Phillips while present in the office at the time of the visit. \par \par Maria Guadalupe [place]  Statement Selected

## 2023-04-19 NOTE — REVIEW OF SYSTEMS
[Postnasal Drip] : postnasal drip [Cough] : cough [Sputum] : sputum [Wheezing] : wheezing [SOB on Exertion] : sob on exertion [Negative] : Psychiatric [Dry Eyes] : no dry eyes [Ear Disturbance] : no ear disturbance [Epistaxis] : no epistaxis [Sore Throat] : no sore throat [Eye Irritation] : no eye irritation [Nasal Congestion] : no nasal congestion [Dry Mouth] : no dry mouth [Sinus Problems] : no sinus problems [Mouth Ulcers] : no mouth ulcers [Poor Dentition] : no poor dentition [Edentulous] : no edentulous [Hemoptysis] : no hemoptysis [Chest Tightness] : no chest tightness [Frequent URIs] : no frequent URIs [Dyspnea] : no dyspnea [Pleuritic Pain] : no pleuritic pain [A.M. Dry Mouth] : no a.m. dry mouth unable to assess/Withheld/decline to answer

## 2023-05-23 ENCOUNTER — APPOINTMENT (OUTPATIENT)
Dept: ORTHOPEDIC SURGERY | Facility: CLINIC | Age: 68
End: 2023-05-23
Payer: COMMERCIAL

## 2023-05-23 VITALS
HEART RATE: 76 BPM | DIASTOLIC BLOOD PRESSURE: 92 MMHG | SYSTOLIC BLOOD PRESSURE: 136 MMHG | HEIGHT: 60 IN | WEIGHT: 145 LBS | BODY MASS INDEX: 28.47 KG/M2

## 2023-05-23 DIAGNOSIS — M17.0 BILATERAL PRIMARY OSTEOARTHRITIS OF KNEE: ICD-10-CM

## 2023-05-23 PROCEDURE — 73564 X-RAY EXAM KNEE 4 OR MORE: CPT | Mod: 50

## 2023-05-23 PROCEDURE — 99203 OFFICE O/P NEW LOW 30 MIN: CPT | Mod: 25

## 2023-05-23 PROCEDURE — 20610 DRAIN/INJ JOINT/BURSA W/O US: CPT | Mod: RT

## 2023-05-24 ENCOUNTER — APPOINTMENT (OUTPATIENT)
Dept: PULMONOLOGY | Facility: CLINIC | Age: 68
End: 2023-05-24

## 2023-05-24 PROBLEM — M17.0 LOCALIZED OSTEOARTHRITIS OF BOTH KNEES: Status: ACTIVE | Noted: 2023-05-23

## 2023-05-24 NOTE — CONSULT LETTER
[Dear  ___] : Dear  [unfilled], [FreeTextEntry1] : I had the pleasure of evaluating your patient in the office today for complaints of bilateral knee pain secondary to osteoarthritis. I have enclosed a copy of today's office notes for your charts and for your review.\par \par Sincerely, \par \par Refugio Cespedes M.D.\par Professor and \par Department of Orthopedic Surgery\par [facility] [place]\par

## 2023-05-24 NOTE — PHYSICAL EXAM
[LE] : Sensory: Intact in bilateral lower extremities [Knee] : patellar 2+ and symmetric bilaterally [PT] : posterior tibial 2+ and symmetric bilaterally [de-identified] : Pt is a pleasant 67 year old female in NAD and AAOx3. 33.7 BMI. The pt has a mildly antalgic gait. Physical examination of both knees reveals normal contours, skin intact with no signs of infection, no erythema, no swelling, no effusion, no distal lymphedema or phlebitis, no patholaxity. ROM of the B/lL knee reveals 0°-135° Strength is 5/5 within this arc of motion. There is pain on palpation to the medial joint line L knee.  \par \par  [de-identified] : X-rays were ordered, obtained, and interpreted by me today: 4 views of the b/l knee demonstrate bilateral tricompartmental knee OA , with no acute fracture or dislocation.\par \par MRI R knee Reviewed Stand up 4/17/23)?: Degenerative changes consistent with osteoarthritis, medial and lateral meniscus tears with menisci extrusion, subchondral cyst lateral plateau\par \par \par MRI L knee Reviewed Stand up 4/17/23)?: Degenerative changes consistent with osteoarthritis, bone marrow edema in the medial aspect of the proximal tibia consistent with insufficiency fx.

## 2023-05-24 NOTE — REVIEW OF SYSTEMS
[Arthralgia] : arthralgia [Joint Pain] : joint pain [Joint Stiffness] : joint stiffness [Negative] : Respiratory [FreeTextEntry9] : B/l knee pain

## 2023-05-24 NOTE — PROCEDURE
[de-identified] : After careful discussion with the patient regarding the risks versus benefits of a corticosteroid and local anesthetic injection, the patient has decided to proceed ahead with the treatment. After sterile preparation of the site, an injection has been given into the Right  knee using 8 cc of half percent Marcaine without epinephrine and 2 cc of betamethasone for a total of 12mg. The site was cleaned and a band-aid was applied. The patient tolerated the injection without complication\par

## 2023-05-24 NOTE — DISCUSSION/SUMMARY
[de-identified] : Pt is a pleasant 67 year old female with b/l knee pain secondary to b/l knee osteoarthritis. XR b/l knees were obtained and reviewed with patient. MRI b/l knees where reviewed with the patient.  The patient does have bone marrow edema consistent with insufficiency fx on the L knee but this is a benign finding which does not need surgical intervention. A lengthy discussion was held regarding the patients condition and treatment options including all risks, benefits, prognosis and outcomes of each were discussed in detail. The patient may also have contribution from Lumbar spine pathology/radiculopathy. The patient would like to continue with conservative management at this time and was advised on the use of tylenol for pain control, icing, knee sleeves (b/l knee sleeves provided today), activity modification, and possible cortisone injections. CSI R knee performed today (See procedure note).  Non-operative treatment was advised and reviewed with the patient today. PT script provided for low impact exercises. If patient fails conservative treatment, a spine referral to Dr Bear is recommended for evaluation of spine etiology. Gel injection can be considered if patient fails CSI. The patient will contact me if there are any concerns.  Follow up will be prn. The patient expressed understanding and all questions were answered.\par \par

## 2023-06-02 ENCOUNTER — APPOINTMENT (OUTPATIENT)
Dept: CARDIOLOGY | Facility: CLINIC | Age: 68
End: 2023-06-02

## 2023-06-05 ENCOUNTER — APPOINTMENT (OUTPATIENT)
Dept: ORTHOPEDIC SURGERY | Facility: CLINIC | Age: 68
End: 2023-06-05

## 2023-08-14 ENCOUNTER — APPOINTMENT (OUTPATIENT)
Dept: CARDIOLOGY | Facility: CLINIC | Age: 68
End: 2023-08-14
Payer: MEDICARE

## 2023-08-14 DIAGNOSIS — R07.89 OTHER CHEST PAIN: ICD-10-CM

## 2023-08-14 PROCEDURE — 93306 TTE W/DOPPLER COMPLETE: CPT

## 2023-08-17 ENCOUNTER — APPOINTMENT (OUTPATIENT)
Dept: PULMONOLOGY | Facility: CLINIC | Age: 68
End: 2023-08-17
Payer: MEDICARE

## 2023-08-17 VITALS
DIASTOLIC BLOOD PRESSURE: 74 MMHG | OXYGEN SATURATION: 99 % | RESPIRATION RATE: 16 BRPM | HEART RATE: 84 BPM | HEIGHT: 60 IN | WEIGHT: 145 LBS | TEMPERATURE: 97.1 F | SYSTOLIC BLOOD PRESSURE: 130 MMHG | BODY MASS INDEX: 28.47 KG/M2

## 2023-08-17 DIAGNOSIS — J44.9 CHRONIC OBSTRUCTIVE PULMONARY DISEASE, UNSPECIFIED: ICD-10-CM

## 2023-08-17 PROCEDURE — 99214 OFFICE O/P EST MOD 30 MIN: CPT | Mod: 25

## 2023-08-17 PROCEDURE — 94010 BREATHING CAPACITY TEST: CPT

## 2023-08-17 NOTE — DISCUSSION/SUMMARY
[FreeTextEntry1] : 6MWT Performed in office not completed as patient c/o dizziness.  RA SPO2 @ REST: 99%   1/31/2023 CHEST CT COMPARISON: CT chest 10/30/2020. LUNGS/AIRWAYS/PLEURA: No endobronchial lesion. Unchanged few bilateral lung nodules up to 5 mm. Unchanged large thin-walled cyst with mural calcification in the inferior right hemithorax. No pleural effusion.  IMPRESSION: - Stable findings since 10/30/2020.

## 2023-08-17 NOTE — PROCEDURE
[FreeTextEntry1] : Pulmonary testing performed in office today because of chest tightness and recent spell of feeling faint.   spi performed in office show  FVC: 117%  FEV1: 118%  PHQ45-75%: 124%

## 2023-08-17 NOTE — HISTORY OF PRESENT ILLNESS
[Former] : former [TextBox_4] : Ms. Mccarthy is a 67 year old, former smoking (quit this year), female with a history of abnormal chest CT, COPD, GERD, low vitamin D, nicotine addiction, overweight, PND, Covid-19 infection (10/2022) and snoring presenting to the office today for an acute care visit.   Her chief concern is recent episode of feeling faint last week.   Patient states she was visiting her mom at her rehab facility and started to experience heart palpitations, body shaking, and feeling as though she was going to pass out.  She notes they placed her on oxygen while at the rehab facility and checked her vital signs, but all were within normal limits. Patient notes she never lost consciousness.   Patient states she is status post echocardiogram with Dr. Phillips's office on 8/14 and has upcoming visit with him on 8/21/2023.  Patient states breathing has improved since quitting smoking and being compliant on her Breo and Incruse inhalers daily. She notes the only time she experiences chest tightness is when she feels "sad or angry".  She states these feelings also cause the heart palpitations and intermittent chest pain.  Patient does admit to increased life stressors with recent increase of feeling anxious and depressed related to increased life stressors.  She denies fever/chills, decreased appetite, increased fatigue, cough, shortness of breath at rest or exertion, wheezing, or chest tightness.   [YearQuit] : 2023

## 2023-08-17 NOTE — ASSESSMENT
[FreeTextEntry1] : Ms. Mccarthy is a 67 year old, former smoking, female with a history of abnormal chest CT, COPD, GERD, low vitamin D, nicotine addiction, overweight, PND, Covid-19 infection (10/2022) and snoring presenting to the office today for an acute care visit. Her chief concern is recent episode of feeling faint last week.   1. Chest tightness: - most likely r/t anxiety as symptom is triggered by emotions, advised patient to see PCP or Behavioral Health to further evaluate. - possibly cardiac: patient to follow with Dr. Phillips as scheduled. - less likely pulmonary etiology: spi in office WNL and patient denies other pulmonary concerns.   2. COPD: - Continue Breo and Incruse one inhale daily. Rinse and gargle post use.  - Continue Daliresp (250 or 500) mg q Day  3. Abnormal Chest CT: - Next Chest CT for 1/2024.   4. GERD:  - continue Omeprazole 40 mg - pre-breakfast - continue Pepcid 40 mg QHS  Patient to follow up with Dr. Agudelo as scheduled for 2/8/2024 -- visit if for after follow up Chest CT. Patient advised to have Chest CT 7 days prior to visit.  Patient to call with further questions and concerns. Patient verbalizes understanding of care plan and is agreeable.

## 2023-08-17 NOTE — REVIEW OF SYSTEMS
[Negative] : Endocrine [Chest Discomfort] : chest discomfort [Palpitations] : palpitations [Dizziness] : dizziness [Depression] : depression [Anxiety] : anxiety [Chest Tightness] : chest tightness [Cough] : no cough [Sputum] : no sputum [Dyspnea] : no dyspnea [Wheezing] : no wheezing [SOB on Exertion] : no sob on exertion [Headache] : no headache

## 2023-08-21 ENCOUNTER — APPOINTMENT (OUTPATIENT)
Dept: CARDIOLOGY | Facility: CLINIC | Age: 68
End: 2023-08-21
Payer: MEDICARE

## 2023-08-21 ENCOUNTER — NON-APPOINTMENT (OUTPATIENT)
Age: 68
End: 2023-08-21

## 2023-08-21 VITALS
RESPIRATION RATE: 15 BRPM | SYSTOLIC BLOOD PRESSURE: 130 MMHG | OXYGEN SATURATION: 99 % | HEART RATE: 76 BPM | BODY MASS INDEX: 26.5 KG/M2 | HEIGHT: 60 IN | DIASTOLIC BLOOD PRESSURE: 82 MMHG | WEIGHT: 135 LBS

## 2023-08-21 VITALS
HEIGHT: 60 IN | RESPIRATION RATE: 16 BRPM | HEART RATE: 75 BPM | OXYGEN SATURATION: 99 % | TEMPERATURE: 97.5 F | WEIGHT: 135 LBS | BODY MASS INDEX: 26.5 KG/M2

## 2023-08-21 DIAGNOSIS — R07.89 OTHER CHEST PAIN: ICD-10-CM

## 2023-08-21 DIAGNOSIS — R06.09 OTHER FORMS OF DYSPNEA: ICD-10-CM

## 2023-08-21 PROCEDURE — 99214 OFFICE O/P EST MOD 30 MIN: CPT

## 2023-08-21 PROCEDURE — 93000 ELECTROCARDIOGRAM COMPLETE: CPT

## 2023-08-21 NOTE — DISCUSSION/SUMMARY
[FreeTextEntry1] : This is a 67-year-old female with past medical history significant for GERD, status post lumpectomy, status post appendectomy, hyperlipidemia, pulmonary nodules who comes in for cardiac follow-up evaluation.  She denies palpitations, dizziness or syncope.  She still gets shortness of breath unrelated to any particular activity. The patient was told to schedule a coronary CTA in February 2023 but "went to the wrong facility".  She never called the office to get the correct location.  She was given the wrong location by the radiology 800 booking team. Electrocardiogram done August 21, 2023 demonstrated normal sinus rhythm at a rate of 74 bpm is otherwise remarkable for small R waves in leads III and aVF. I have asked the patient to schedule coronary CTA to evaluate her coronary artery anatomy and ischemic burden. Echo Doppler examination done August 14, 2023 demonstrated normal left ventricular function with estimated ejection fraction 64%, mild left ventricular hypertrophy, mild mitral valve regurgitation, minimal tricuspid valve regurgitation, and trace aortic valve regurgitation. Lipid panel done February 26, 2023 demonstrated cholesterol 211, HDL 56, triglycerides 157, LDL direct 122, non-HDL cholesterol of 155 mg/dL and triglycerides 157 mg/dL with hemoglobin A1c of 5.6 She continues on her current dose of Micardis 40 mg daily.  She is on Zocor 20 mg/day for her lipids. She will have new blood work done for lipid profile prior to next visit.  She is instructed follow-up with her primary care physician and pulmonologist. The patient had a normal exercise stress test January 25, 2021. She will have blood work done prior to her next visit to check a lipid panel and SMA-20. She was born in [place] and has no history of rheumatic fever.  She does not drink excessive caffeine or alcohol.   Electrocardiogram done February while 2020 demonstrates normal sinus rhythm at a rate of 78 bpm is otherwise remarkable for left axis deviation.  The patient understands that aerobic exercises must be increased to 40 minutes 4 times per week. A detailed discussion of lifestyle modification was done today. The patient has a good understanding of the diagnosis, and treatment plan. Lifestyle modification was also outlined.

## 2023-08-21 NOTE — PHYSICAL EXAM
[Well Developed] : well developed [No Acute Distress] : no acute distress [Normal Venous Pressure] : normal venous pressure [No Carotid Bruit] : no carotid bruit [Normal S1, S2] : normal S1, S2 [No Murmur] : no murmur [No Rub] : no rub [Clear Lung Fields] : clear lung fields [Good Air Entry] : good air entry [No Respiratory Distress] : no respiratory distress  [Soft] : abdomen soft [Non Tender] : non-tender [No Masses/organomegaly] : no masses/organomegaly [Normal Bowel Sounds] : normal bowel sounds [Normal Gait] : normal gait [No Edema] : no edema [No Cyanosis] : no cyanosis [No Clubbing] : no clubbing [No Varicosities] : no varicosities [No Rash] : no rash [No Skin Lesions] : no skin lesions [Moves all extremities] : moves all extremities [No Focal Deficits] : no focal deficits [Normal Speech] : normal speech [Alert and Oriented] : alert and oriented [Normal memory] : normal memory [General Appearance - Well Developed] : well developed [Normal Appearance] : normal appearance [General Appearance - Well Nourished] : well nourished [Normal Conjunctiva] : the conjunctiva exhibited no abnormalities [Eyelids - No Xanthelasma] : the eyelids demonstrated no xanthelasmas [Normal Oral Mucosa] : normal oral mucosa [Normal Oropharynx] : normal oropharynx [Normal Jugular Venous V Waves Present] : normal jugular venous V waves present [] : no respiratory distress [Respiration, Rhythm And Depth] : normal respiratory rhythm and effort [Exaggerated Use Of Accessory Muscles For Inspiration] : no accessory muscle use [Auscultation Breath Sounds / Voice Sounds] : lungs were clear to auscultation bilaterally [Bowel Sounds] : normal bowel sounds [Abdomen Soft] : soft [Abdomen Tenderness] : non-tender [Abnormal Walk] : normal gait [Nail Clubbing] : no clubbing of the fingernails [Cyanosis, Localized] : no localized cyanosis [Skin Color & Pigmentation] : normal skin color and pigmentation [Skin Turgor] : normal skin turgor [Oriented To Time, Place, And Person] : oriented to person, place, and time [Impaired Insight] : insight and judgment were intact [Affect] : the affect was normal [5th Left ICS - MCL] : palpated at the 5th LICS in the midclavicular line [Normal] : normal [No Precordial Heave] : no precordial heave was noted [Normal Rate] : normal [Rhythm Regular] : regular [Normal S1] : normal S1 [Normal S2] : normal S2 [No Gallop] : no gallop heard [I] : a grade 1 [2+] : left 2+ [No Abnormalities] : the abdominal aorta was not enlarged and no bruit was heard [No Pitting Edema] : no pitting edema present [Lethargic] : lethargic

## 2023-08-21 NOTE — CARDIOLOGY SUMMARY
[___] : [unfilled] [de-identified] : 9/29/2021 [de-identified] : 1/25/2021 [de-identified] : 1/25/2021

## 2023-08-21 NOTE — ASSESSMENT
[FreeTextEntry1] : Prior note nurse practitioner Ant February 6, 2023::  This is a 67 year year old female here today for follow up cardiac evaluation.  She has a past medical history significant for  GERD, status post lumpectomy, status post appendectomy, hyperlipidemia, pulmonary nodules.  Today she is feeling generally well and does not have any complaints at this time.  She had recent follow-up with her pulmonary doctor, Dr. Agudelo's office where he did blood work and she had an elevated C-reactive protein of 8.56.  She also had a CAT scan of her lungs which demonstrated some subjectively mild amount of coronary calcified plaque.  Her LDL goal should be 70 or less.  She would like to make a note that she recently had COVID and is recovering.  She states that after she was diagnosed with COVID she followed up with her pulmonologist and everything was stable however she is having chest pain that comes and goes, she states it is located in the middle of her chest and that she has dyspnea on exertion/short of breath especially when laying down/while she reads (she reads books and listens to audio tapes).  At the time of her visit today she does not have any chest pain but states it occurs at random times.  -She is currently on simvastatin 20 mg for cholesterol management.  She is planning on following up with her gastro doctor for management of her reflux.   -Pt is stable from a cardiac standpoint.  -She does state that she is feeling more tired and stressed since she is taking care of her mother and her  who was recently discharged from the hospital.  -She was born in [place] and has no history of rheumatic fever.  She does not drink excessive caffeine or alcohol.    BLOOD PRESSURE: -BP is elevated on today's exam.  She states that she has been noticing elevated readings as of lately.  BLOOD WORK: -New blood work was done 02/06/2023  to evaluate lipid profile, CBC, BMP, hepatic function, A1C and TSH.   CHOLESTEROL CONTROL: -Patient will continue the advised  TLC diet and to continue follow-up for treatment of hyperlipidemia and repeat blood testing with diet and exercise. I have discussed different exercises and the importance of maintenance of optimal body weight. The importance of staying within guidelines and recommendations was stressed to the patient today and they acknowledged that they understand this to me verbally.  TESTING/REPORTS: -EKG done 02/06/2023 which demonstrated regular sinus rhythm with nonspecific ST-T wave changes BPM of 98.  -EKG done 9/29/2021 which demonstrated regular sinus rhythm with nonspecific ST-T wave changes, BPM of 63.   -The patient had an echocardiogram done on 1/25/2021 demonstrated mild mitral and tricuspid regurgitation with normal left ventricular systolic function.  -EF on echo reported to be 65%.   -The patient had a normal exercise stress test done on 1/25/2021  PLAN: -The patient will schedule a CCTA with [facility] to further evaluate her coronary artery anatomy. -The patient will schedule an Echo Doppler examination to evaluate murmur, left ventricular function, chamber size, and rule out hypertrophy.  -She will start telmisartan 40 mg daily for better blood pressure control. -She will continue with her current medications and will contact the office if she is having any complaints between now and their next follow up appointment. -Based off of today's blood work her LDL should be less than 70 since she does have coronary artery calcification present.  She may benefit from switching her lipid-lowering therapy.  We will discuss this after we review the results of today's blood work -She will follow up with her GI doctor and pulmonologist routinely. -She will continue TLC with proper diet.  I have discussed the plan of care with Ms. APARNA NIX  and she  will follow up in 3 months. She is compliant with all of her medications.  The patient understands that aerobic exercises must be increased to minutes 4 times/week and a detailed discussion of lifestyle modification was done today.  The patient has a good understanding of the diagnosis, treatment plan and lifestyle modification.  She will contact me at the office for any questions with their care or any changes in their health status.  The plan of care was discussed with supervising physician, Dr. Phillips while present in the office at the time of the visit.   Maria Guadalupe [place]

## 2023-08-27 PROBLEM — R07.89 CHEST DISCOMFORT: Status: ACTIVE | Noted: 2023-02-06

## 2023-09-18 ENCOUNTER — APPOINTMENT (OUTPATIENT)
Dept: CT IMAGING | Facility: CLINIC | Age: 68
End: 2023-09-18
Payer: MEDICARE

## 2023-09-18 ENCOUNTER — OUTPATIENT (OUTPATIENT)
Dept: OUTPATIENT SERVICES | Facility: HOSPITAL | Age: 68
LOS: 1 days | End: 2023-09-18
Payer: MEDICARE

## 2023-09-18 ENCOUNTER — RESULT REVIEW (OUTPATIENT)
Age: 68
End: 2023-09-18

## 2023-09-18 DIAGNOSIS — Z00.8 ENCOUNTER FOR OTHER GENERAL EXAMINATION: ICD-10-CM

## 2023-09-18 PROCEDURE — 75574 CT ANGIO HRT W/3D IMAGE: CPT | Mod: 26,MH

## 2023-09-18 PROCEDURE — 75574 CT ANGIO HRT W/3D IMAGE: CPT | Mod: MH

## 2023-10-04 ENCOUNTER — APPOINTMENT (OUTPATIENT)
Dept: CARDIOLOGY | Facility: CLINIC | Age: 68
End: 2023-10-04
Payer: MEDICARE

## 2023-10-04 VITALS
SYSTOLIC BLOOD PRESSURE: 140 MMHG | RESPIRATION RATE: 16 BRPM | DIASTOLIC BLOOD PRESSURE: 80 MMHG | TEMPERATURE: 98.3 F | OXYGEN SATURATION: 99 % | HEART RATE: 80 BPM | BODY MASS INDEX: 26.11 KG/M2 | HEIGHT: 60 IN | WEIGHT: 133 LBS

## 2023-10-04 PROCEDURE — 99214 OFFICE O/P EST MOD 30 MIN: CPT

## 2023-10-04 RX ORDER — ROSUVASTATIN CALCIUM 20 MG/1
20 TABLET, FILM COATED ORAL
Qty: 90 | Refills: 3 | Status: ACTIVE | COMMUNITY
Start: 2023-10-04 | End: 1900-01-01

## 2023-10-04 RX ORDER — TELMISARTAN 40 MG/1
40 TABLET ORAL DAILY
Qty: 90 | Refills: 1 | Status: DISCONTINUED | COMMUNITY
Start: 2023-02-06 | End: 2023-10-04

## 2023-10-04 RX ORDER — SIMVASTATIN 20 MG/1
20 TABLET, FILM COATED ORAL
Qty: 90 | Refills: 0 | Status: DISCONTINUED | COMMUNITY
Start: 2020-10-21 | End: 2023-10-04

## 2023-10-10 ENCOUNTER — APPOINTMENT (OUTPATIENT)
Dept: PULMONOLOGY | Facility: CLINIC | Age: 68
End: 2023-10-10
Payer: MEDICARE

## 2023-10-10 VITALS
HEIGHT: 60 IN | DIASTOLIC BLOOD PRESSURE: 70 MMHG | SYSTOLIC BLOOD PRESSURE: 120 MMHG | TEMPERATURE: 97.8 F | OXYGEN SATURATION: 98 % | RESPIRATION RATE: 18 BRPM | BODY MASS INDEX: 26.11 KG/M2 | WEIGHT: 133 LBS | HEART RATE: 86 BPM

## 2023-10-10 DIAGNOSIS — R06.83 SNORING: ICD-10-CM

## 2023-10-10 DIAGNOSIS — R09.82 POSTNASAL DRIP: ICD-10-CM

## 2023-10-10 PROCEDURE — 94010 BREATHING CAPACITY TEST: CPT

## 2023-10-10 PROCEDURE — 99214 OFFICE O/P EST MOD 30 MIN: CPT | Mod: 25

## 2023-10-10 PROCEDURE — 95012 NITRIC OXIDE EXP GAS DETER: CPT

## 2023-10-10 PROCEDURE — 94727 GAS DIL/WSHOT DETER LNG VOL: CPT

## 2023-10-10 PROCEDURE — 94729 DIFFUSING CAPACITY: CPT

## 2023-12-11 ENCOUNTER — APPOINTMENT (OUTPATIENT)
Dept: CARDIOLOGY | Facility: CLINIC | Age: 68
End: 2023-12-11

## 2023-12-11 DIAGNOSIS — I11.9 HYPERTENSIVE HEART DISEASE W/OUT HEART FAILURE: ICD-10-CM

## 2023-12-15 ENCOUNTER — APPOINTMENT (OUTPATIENT)
Dept: CARDIOLOGY | Facility: CLINIC | Age: 68
End: 2023-12-15

## 2024-01-29 ENCOUNTER — APPOINTMENT (OUTPATIENT)
Dept: CARDIOLOGY | Facility: CLINIC | Age: 69
End: 2024-01-29

## 2024-02-08 ENCOUNTER — APPOINTMENT (OUTPATIENT)
Dept: PULMONOLOGY | Facility: CLINIC | Age: 69
End: 2024-02-08

## 2024-02-15 ENCOUNTER — APPOINTMENT (OUTPATIENT)
Dept: CARDIOLOGY | Facility: CLINIC | Age: 69
End: 2024-02-15

## 2024-04-10 ENCOUNTER — APPOINTMENT (OUTPATIENT)
Dept: PULMONOLOGY | Facility: CLINIC | Age: 69
End: 2024-04-10
Payer: MEDICARE

## 2024-04-10 VITALS
HEART RATE: 74 BPM | SYSTOLIC BLOOD PRESSURE: 126 MMHG | DIASTOLIC BLOOD PRESSURE: 80 MMHG | BODY MASS INDEX: 27.92 KG/M2 | RESPIRATION RATE: 16 BRPM | TEMPERATURE: 97.7 F | HEIGHT: 58 IN | WEIGHT: 133 LBS | OXYGEN SATURATION: 98 %

## 2024-04-10 DIAGNOSIS — R93.89 ABNORMAL FINDINGS ON DIAGNOSTIC IMAGING OF OTHER SPECIFIED BODY STRUCTURES: ICD-10-CM

## 2024-04-10 DIAGNOSIS — R06.02 SHORTNESS OF BREATH: ICD-10-CM

## 2024-04-10 DIAGNOSIS — J45.909 UNSPECIFIED ASTHMA, UNCOMPLICATED: ICD-10-CM

## 2024-04-10 DIAGNOSIS — R79.89 OTHER SPECIFIED ABNORMAL FINDINGS OF BLOOD CHEMISTRY: ICD-10-CM

## 2024-04-10 DIAGNOSIS — K21.9 GASTRO-ESOPHAGEAL REFLUX DISEASE W/OUT ESOPHAGITIS: ICD-10-CM

## 2024-04-10 DIAGNOSIS — F17.200 NICOTINE DEPENDENCE, UNSPECIFIED, UNCOMPLICATED: ICD-10-CM

## 2024-04-10 DIAGNOSIS — E66.3 OVERWEIGHT: ICD-10-CM

## 2024-04-10 PROCEDURE — 99214 OFFICE O/P EST MOD 30 MIN: CPT | Mod: 25

## 2024-04-10 PROCEDURE — 94010 BREATHING CAPACITY TEST: CPT

## 2024-04-10 PROCEDURE — 95012 NITRIC OXIDE EXP GAS DETER: CPT

## 2024-04-10 RX ORDER — FLUTICASONE FUROATE AND VILANTEROL TRIFENATATE 200; 25 UG/1; UG/1
200-25 POWDER RESPIRATORY (INHALATION)
Qty: 3 | Refills: 1 | Status: ACTIVE | COMMUNITY
Start: 2020-09-24 | End: 1900-01-01

## 2024-04-10 RX ORDER — ROFLUMILAST 500 UG/1
500 TABLET ORAL DAILY
Qty: 90 | Refills: 1 | Status: ACTIVE | COMMUNITY
Start: 2023-01-18 | End: 1900-01-01

## 2024-04-10 RX ORDER — TIOTROPIUM BROMIDE INHALATION SPRAY 3.12 UG/1
2.5 SPRAY, METERED RESPIRATORY (INHALATION) DAILY
Qty: 3 | Refills: 1 | Status: ACTIVE | COMMUNITY
Start: 2021-09-24 | End: 1900-01-01

## 2024-04-10 NOTE — ADDENDUM
[FreeTextEntry1] : Documented by Roman Rg acting as a scribe for Dr. Eddie Agudelo on 04/10/2024.   All medical record entries made by the Scribe were at my, Dr. Eddie Agudelo's, direction and personally dictated by me on 04/10/2024. I have reviewed the chart and agree that the record accurately reflects my personal performance of the history, physical exam, assessment and plan. I have also personally directed, reviewed, and agree with the discharge instructions.

## 2024-04-10 NOTE — HISTORY OF PRESENT ILLNESS
[FreeTextEntry1] : Ms. Mccarthy is a 68 year old female with a history of abnormal chest CT, COPD, GERD, low vitamin D, nicotine addiction, overweight, PND and snoring presenting to the office today for a follow up visit. Her chief complaint is  - she notes having pain all over her body (both hands and back) which gets worse every day - she notes occasions when her fingers get "stuck" - she notes her pain is sensitive to touch - she notes her breathing feels better  - she notes mourning the recent passing of her mother - she notes last night having palpitations  - she notes her energy levels are lower and needs to take a rest at times before getting up to do daily activities - she notes having a blood test done for genetic cancers which came back clear - she notes doing at home exercises - she notes her sinuses are clear - she notes no new medications, vitamins, or supplements - she denies smoking - she notes she has never seen a rheumatologist  -she denies any headaches, nausea, emesis, fever, chills, sweats, chest pain, chest pressure, coughing, wheezing, constipation, diarrhea, vertigo, dysphagia, heartburn, reflux, itchy eyes, itchy ears, leg swelling, leg pain, hoarseness, or sour taste in the mouth.

## 2024-04-10 NOTE — PROCEDURE
[FreeTextEntry1] : PFT reveals normal flows, with an FEV1 of 2.30 L, which is 134% of predicted, with a normal flow volume loop. PFTs were performed to evaluate for SOB  FENO was 11; a normal value being less than 25 Fractional exhaled nitric oxide (FENO) is regarded as a simple, noninvasive method for assessing eosinophilic airway inflammation. Produced by a variety of cells within the lung, nitric oxide (NO) concentrations are generally low in healthy individuals. However, high concentrations of NO appear to be involved in nonspecific host defense mechanisms and chronic inflammatory diseases such as asthma. The American Thoracic Society (ATS) therefore has strongly recommended using FENO to aid in the assessment, management, and long-term monitoring of eosinophilic airway inflammation and asthma, and for identifying steroid responsive individuals whose chronic respiratory symptoms may be caused by airway inflammation. In their 2011 clinical practice guideline, the ATS emphasizes the importance of using FENO.

## 2024-04-10 NOTE — PHYSICAL EXAM
[No Acute Distress] : no acute distress [Normal Oropharynx] : normal oropharynx [II] : Mallampati Class: II [Normal Appearance] : normal appearance [No Neck Mass] : no neck mass [Normal Rate/Rhythm] : normal rate/rhythm [Normal S1, S2] : normal s1, s2 [No Murmurs] : no murmurs [No Resp Distress] : no resp distress [Clear to Auscultation Bilaterally] : clear to auscultation bilaterally [No Abnormalities] : no abnormalities [Kyphosis] : kyphosis [Benign] : benign [Normal Gait] : normal gait [No Clubbing] : no clubbing [No Cyanosis] : no cyanosis [No Edema] : no edema [FROM] : FROM [Normal Color/ Pigmentation] : normal color/ pigmentation [No Focal Deficits] : no focal deficits [Oriented x3] : oriented x3 [Normal Affect] : normal affect [TextBox_68] : I:E 1:3; Clear  [TextBox_80] : mild kyphosis

## 2024-04-10 NOTE — ASSESSMENT
[FreeTextEntry1] : Ms. 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 cardiac/PE- now stable except for Arhtritis  Problem 1: COPD at risk secondary to prior smoking hx.  -continue Daliresp 500 mg qDay  -COPD is a progressive disease and although it can't be cured , appropriate management can slow its progression, reduce frequency and severity of exacerbations, and improve symptoms and the patient quality of life. Hospitalizations are the greatest contributor to the total COPD costs and account for up to 87% of total COPD related costs. Exacerbations are the main cause of admissions and subsequently account for the 40-75% of COPD costs. Inhaled maintenance therapy reduces the incidence of exacerbations in patients with stable COPD. Incorrect inhaler use and nonadherence are major obstacles to achieving COPD treatment goals. Many COPD patients have challenges (impaired inhalation, limited dexterity, reduced cognition: that limit their ability to correctly use their COPD treatment devices resulting in reduced symptom control. Of most importance is smoking cessation and early intervention with respiratory illnesses and contemplation for pulmonary rehab to enhance quality of life.   Problem 1A: Asthma ?active past Covid-19 10/2022 -continue Breo Ellipta 200 at 1 inhalation  QD/ -add Spiriva at 2 inhalations QAM  -off Singulair 10 mg before bed - side effects -add Albuterol via nebulizer, Q6H   - Asthma is believed to be caused by inherited (genetic) and environmental factor, but its exact cause is unknown. Asthma may be triggered by allergens, lung infections, or irritants in the air. Asthma triggers are different for each person. -Inhaler technique reviewed as well as oral hygiene techniques reviewed with patient. Avoidance of cold air, extremes of temperature, rescue inhaler should be used before exercise. Order of medication reviewed with patient. Recommended use of a cool mist humidifier in the bedroom.   Problem 1B: Cardiac - chest pain (quiet) -Recommended to complete a cardiac evaluation with Dr. Phillips ; cardiac CRP/ ESR/ D-dimer, CBC (WNL)  Problem 2: Abnormal CT scan (next 9/2024) -most consistent with inflammation. ?Hypersensitivity pneumonitis or sarcoidosis ; lung Cancer @ [facility] -ACE level and hypersensitivity level - both normal (past) -Follow up CT scan (9/2024) -PPD (-)  CAT scans are the only radiological modality to identify abnormalities w/in the lungs with regards to nodules/masses/lymph nodes. Risks, benefits were reviewed in detail. The guidelines for abnormalities include follow up CT scans at various intervals which could range from 6 weeks to 1 year intervals. If there is a change for the worse then consideration for a biopsy will be considered if you are a candidate. Second opinion evaluation with a thoracic surgeon or an interventional radiologist could be offered.   Problem 3: Nicotine addiction ( cessation discussed 01/18/2023) -unsuccessful smoking cessation Discussed for five minutes with the patient the risks/associations with continued smoking including COPD, emphysema, shortness of breath, renal cancer, bladder cancer, stroke risk, cardiac disease, etc. Smoking cessation was discussed at length and highly encouraged. Various options to aid cessation was discussed including use of Chantix, Nicotrol, nicotine products, laser therapy, hypnosis, Wellbutrin, etc.   Problem 4: GERD -  -continue Omeprazole 40 mg - pre-breakfast  -continue Pepcid 40 mg QHS  -Rule of 2's: avoid eating too much, eating too late, eating too spicy, or eating within 2 hours of sleeping -Things to avoid including overeating, spicy foods, tight clothing, eating within three hours of bed, this list is not all inclusive.  -For treatment of reflux, possible options discussed including diet control, H2 blockers, PPIs, as well as coating motility agents discussed as treatment options. Timing of meals and proximity of last meal to sleep were discussed. If symptoms persist, a formal gastrointestinal evaluation is needed.  Problem 5: Postnasal drip / allergy -Olopatadine nasal spray .06% 1 sniff each nostril BID -add Clarinex 5 mg QAM    Environmental measures for allergies were encouraged including mattress and pillow cover, air purifier, and environmental controls.   Problem 6: Overweight (improved) -Weight loss, exercise, and diet control were discussed and are highly encouraged. Treatment options were given such as, aqua therapy, and contacting a nutritionist. Recommended to use the elliptical, stationary bike, less use of treadmill. Mindful eating was explained to the patient Obesity is associated with worsening asthma, shortness of breath, and potential for cardiac disease, diabetes, and other underlying medical conditions.   Problem 7: (+) VARGHESE -recommended to get Dental Device -Recommending home sleep study.  variability in home sleep study will send for lab sleep study Sleep apnea is associated with adverse clinical consequences which an affect most organ systems. Cardiovascular disease risk includes arrhythmias, atrial fibrillation, hypertension, coronary artery disease, and stroke. Metabolic disorders include diabetes type 2, non-alcoholic fatty liver disease. Mood disorder especially depression; and cognitive decline especially in the elderly. Associations with chronic reflux/Danielle's esophagus some but not all inclusive.  -Reasons include arousal consistent with hypopnea; respiratory events most prominent in REM sleep or supine position; therefore sleep staging and body position are important for accurate diagnosis and estimation of AHI.   problem 8: low vitamin D -Supplement 50,000 q 2 months  Has been associated with asthma exacerbations and increased allergic symptoms. The goal based on recent information is maintaining levels between 50-70 and low normal is 30. Recommended 50,000 units every two weeks to once a month depending on the level.   Problem 9: Health Maintenance/COVID19 Precautions: - S/p Covid 19 vaccine (Moderna) x2  -Covid 19 vaccine discussed at length with patient; booster discussed and recommended to wait for vaccine containing new delta variant  - Clean your hands often. Wash your hands often with soap and water for at least 20 seconds, especially after blowing your nose, coughing, or sneezing, or having been in a public place. - If soap and water are not available, use a hand  that contains at least 60% alcohol. - To the extent possible, avoid touching high-touch surfaces in public places - elevator buttons, door handles, handrails, handshaking with people, etc. Use a tissue or your sleeve to cover your hand or finger if you must touch something. - Wash your hands after touching surfaces in public places. Immune Support Recommendations: -OTC Vitamin C 500mg BID  -OTC Quercetin 250-500mg BID  -OTC Zinc 75-100mg per day  -OTC Melatonin 1or 2mg a night  -OTC Vitamin D 1-4000mg per day  -OTC Tonic Water 8oz per day  Problem 10: Health maintenance - Arthritis - formal rheum javieral - Khanh Barth -s/p COVID 19 vaccine (1 of 2)  -recommended Neurologic evaluation ([place])  - Discussed Pneumovax and influenza  (completed) - Flu shot - 2022  f/u in 4 months with full PFTs pt is encouraged to call or fax the office with any questions or concerns.  Explained to the pt in full detail with demonstrations how to use the inhalers and inhaler hygiene.  -Education provided to the PT regarding their visit and conditions.  She is encouraged to call with any questions, concerns, or changes

## 2024-05-09 ENCOUNTER — LABORATORY RESULT (OUTPATIENT)
Age: 69
End: 2024-05-09

## 2024-05-09 ENCOUNTER — APPOINTMENT (OUTPATIENT)
Dept: CARDIOLOGY | Facility: CLINIC | Age: 69
End: 2024-05-09
Payer: MEDICARE

## 2024-05-09 ENCOUNTER — NON-APPOINTMENT (OUTPATIENT)
Age: 69
End: 2024-05-09

## 2024-05-09 VITALS
HEART RATE: 67 BPM | RESPIRATION RATE: 16 BRPM | TEMPERATURE: 97.9 F | DIASTOLIC BLOOD PRESSURE: 82 MMHG | HEIGHT: 58 IN | SYSTOLIC BLOOD PRESSURE: 129 MMHG | OXYGEN SATURATION: 98 % | BODY MASS INDEX: 28.15 KG/M2 | WEIGHT: 134.13 LBS

## 2024-05-09 DIAGNOSIS — R03.0 ELEVATED BLOOD-PRESSURE READING, W/OUT DIAGNOSIS OF HYPERTENSION: ICD-10-CM

## 2024-05-09 DIAGNOSIS — I25.10 ATHEROSCLEROTIC HEART DISEASE OF NATIVE CORONARY ARTERY W/OUT ANGINA PECTORIS: ICD-10-CM

## 2024-05-09 DIAGNOSIS — I34.0 NONRHEUMATIC MITRAL (VALVE) INSUFFICIENCY: ICD-10-CM

## 2024-05-09 DIAGNOSIS — E78.00 PURE HYPERCHOLESTEROLEMIA, UNSPECIFIED: ICD-10-CM

## 2024-05-09 DIAGNOSIS — I07.1 RHEUMATIC TRICUSPID INSUFFICIENCY: ICD-10-CM

## 2024-05-09 DIAGNOSIS — G47.33 OBSTRUCTIVE SLEEP APNEA (ADULT) (PEDIATRIC): ICD-10-CM

## 2024-05-09 DIAGNOSIS — I51.7 CARDIOMEGALY: ICD-10-CM

## 2024-05-09 PROCEDURE — 93000 ELECTROCARDIOGRAM COMPLETE: CPT

## 2024-05-09 PROCEDURE — 99214 OFFICE O/P EST MOD 30 MIN: CPT

## 2024-05-09 PROCEDURE — G2211 COMPLEX E/M VISIT ADD ON: CPT

## 2024-05-09 RX ORDER — PANTOPRAZOLE 40 MG/1
40 TABLET, DELAYED RELEASE ORAL
Qty: 30 | Refills: 0 | Status: COMPLETED | COMMUNITY
Start: 2020-10-21 | End: 2024-05-09

## 2024-05-09 RX ORDER — TELMISARTAN 80 MG/1
80 TABLET ORAL
Qty: 90 | Refills: 1 | Status: ACTIVE | COMMUNITY
Start: 2023-10-04 | End: 1900-01-01

## 2024-05-09 RX ORDER — DESLORATADINE 5 MG/1
5 TABLET ORAL DAILY
Qty: 90 | Refills: 1 | Status: COMPLETED | COMMUNITY
Start: 2020-09-24 | End: 2024-05-09

## 2024-05-09 RX ORDER — FLUTICASONE PROPIONATE 50 UG/1
50 SPRAY, METERED NASAL
Qty: 16 | Refills: 0 | Status: COMPLETED | COMMUNITY
Start: 2020-10-21 | End: 2024-05-09

## 2024-05-09 RX ORDER — ERGOCALCIFEROL 1.25 MG/1
1.25 MG CAPSULE, LIQUID FILLED ORAL
Qty: 6 | Refills: 1 | Status: COMPLETED | COMMUNITY
Start: 2017-07-07 | End: 2024-05-09

## 2024-05-09 NOTE — ASSESSMENT
[FreeTextEntry1] : This is a 68-year-old female here today for follow-up cardiac evaluation.  She has a past medical history significant for GERD, status post lumpectomy, status post appendectomy, hyperlipidemia, pulmonary nodules.  -She was born in [place] and has no history of rheumatic fever. She does not drink excessive caffeine or alcohol.  HPI: She is feeling generally well today and denies chest pain, dizziness, heart palpitations, recent episodes of syncope or falls, SOB, or dyspnea at this time.  Current Medications: Rosuvastatin 20 mg daily and Telmisartan 80 mg daily    BLOOD PRESSURE: Controlled.   BLOOD WORK:  *LDL target goal < 70* -New blood work was done 05/09/2024 to evaluate lipid profile, CBC, BMP, hepatic function, A1C and TSH. -Lipid panel done February 26, 2023 demonstrated cholesterol 211, HDL 56, triglycerides 157, LDL direct 122, non-HDL cholesterol of 155 mg/dL and triglycerides 157 mg/dL with hemoglobin A1c of 5.6   TESTING/REPORTS:   -EKG done 05/09/2024 demonstrated regular sinus rhythm rate 67 BPM otherwise remarkable for nonspecific ST-T wave changes.   -September 18, 2023 demonstrated a coronary calcium score of 13 units while in the left anterior descending artery, normal left main artery, left coronary artery with less than 30% plaque, minimal plaque of less than 30% in the mid and proximal regions, ramus intermediate branch of minimal less than 30% plaque, right coronary artery had minimal noncalcified plaque in the proximal mid and distal regions.  She is aware of the pulmonary nodules that were found, and she follows up with pulmonary medicine regarding that issue.  -Electrocardiogram done August 21, 2023 demonstrated normal sinus rhythm at a rate of 74 bpm is otherwise remarkable for small R waves in leads III and aVF.  -Echo Doppler examination done August 14, 2023 demonstrated normal left ventricular function with estimated ejection fraction 64%, mild left ventricular hypertrophy, mild mitral valve regurgitation, minimal tricuspid valve regurgitation, and trace aortic valve regurgitation.  -EKG done 02/06/2023 which demonstrated regular sinus rhythm with nonspecific ST-T wave changes BPM of 98.  -EKG done 9/29/2021 which demonstrated regular sinus rhythm with nonspecific ST-T wave changes, BPM of 63.  -The patient had an echocardiogram done on 1/25/2021 demonstrated mild mitral and tricuspid regurgitation with normal left ventricular systolic function. -EF on echo reported to be 65%.  -The patient had a normal exercise stress test done on 1/25/2021   PLAN: -She will continue with her current medications and will contact the office if she is having any complaints between now and their next follow up appointment. -Patient will schedule echocardiogram doppler examination to evaluate murmur, left ventricular function, chamber size, and rule out hypertrophy.   I have discussed the plan of care with Ms. APARNA NIX and she will follow up in 3-4 months. She is compliant with all of her medications.  The patient understands that aerobic exercises must be increased to minutes 4 times/week and a detailed discussion of lifestyle modification was done today. The patient has a good understanding of the diagnosis, treatment plan and lifestyle modification. She will contact me at the office for any questions with their care or any changes in their health status.   Dr Phillips, supervising physician, was present in the office with LINDA Muñoz NP during the guillen portions of the history and exam. The plan was discussed in detail as documented in the note.   LINDA Muñoz NP

## 2024-05-09 NOTE — PHYSICAL EXAM
[Well Developed] : well developed [No Acute Distress] : no acute distress [Lethargic] : lethargic [Normal Venous Pressure] : normal venous pressure [No Carotid Bruit] : no carotid bruit [Normal S1, S2] : normal S1, S2 [No Rub] : no rub [Clear Lung Fields] : clear lung fields [Good Air Entry] : good air entry [No Respiratory Distress] : no respiratory distress  [Soft] : abdomen soft [Non Tender] : non-tender [No Masses/organomegaly] : no masses/organomegaly [Normal Bowel Sounds] : normal bowel sounds [Normal Gait] : normal gait [No Edema] : no edema [No Cyanosis] : no cyanosis [No Clubbing] : no clubbing [No Varicosities] : no varicosities [No Rash] : no rash [No Skin Lesions] : no skin lesions [Moves all extremities] : moves all extremities [No Focal Deficits] : no focal deficits [Normal Speech] : normal speech [Alert and Oriented] : alert and oriented [Normal memory] : normal memory [General Appearance - Well Developed] : well developed [Normal Appearance] : normal appearance [General Appearance - Well Nourished] : well nourished [Normal Conjunctiva] : the conjunctiva exhibited no abnormalities [Eyelids - No Xanthelasma] : the eyelids demonstrated no xanthelasmas [Normal Oral Mucosa] : normal oral mucosa [Normal Oropharynx] : normal oropharynx [Normal Jugular Venous V Waves Present] : normal jugular venous V waves present [] : no respiratory distress [Respiration, Rhythm And Depth] : normal respiratory rhythm and effort [Exaggerated Use Of Accessory Muscles For Inspiration] : no accessory muscle use [Auscultation Breath Sounds / Voice Sounds] : lungs were clear to auscultation bilaterally [Bowel Sounds] : normal bowel sounds [Abdomen Soft] : soft [Abdomen Tenderness] : non-tender [Abnormal Walk] : normal gait [Nail Clubbing] : no clubbing of the fingernails [Cyanosis, Localized] : no localized cyanosis [Skin Color & Pigmentation] : normal skin color and pigmentation [Skin Turgor] : normal skin turgor [Oriented To Time, Place, And Person] : oriented to person, place, and time [Impaired Insight] : insight and judgment were intact [Affect] : the affect was normal [5th Left ICS - MCL] : palpated at the 5th LICS in the midclavicular line [Normal] : normal [No Precordial Heave] : no precordial heave was noted [Normal Rate] : normal [Rhythm Regular] : regular [Normal S1] : normal S1 [Normal S2] : normal S2 [No Gallop] : no gallop heard [I] : a grade 1 [2+] : left 2+ [No Abnormalities] : the abdominal aorta was not enlarged and no bruit was heard [No Pitting Edema] : no pitting edema present [Apical Thrill] : no thrill palpable at the apex [S3] : no S3 [S4] : no S4 [Right Carotid Bruit] : no bruit heard over the right carotid [Left Carotid Bruit] : no bruit heard over the left carotid [Right Femoral Bruit] : no bruit heard over the right femoral artery [Left Femoral Bruit] : no bruit heard over the left femoral artery

## 2024-05-09 NOTE — REASON FOR VISIT
[Hyperlipidemia] : hyperlipidemia [Follow-Up - Clinic] : a clinic follow-up of [Palpitations] : palpitations [FreeTextEntry3] : Dr. Oleary [FreeTextEntry1] : murmur

## 2024-05-09 NOTE — CARDIOLOGY SUMMARY
[___] : [unfilled] [de-identified] : 9/29/2021 [de-identified] : 1/25/2021 [de-identified] : 1/25/2021

## 2024-05-09 NOTE — DISCUSSION/SUMMARY
[FreeTextEntry1] : Dr. Phillips-(PRIOR VISIT and PMH WITH Dr. Phillips):  This is a 67-year-old female with past medical history significant for GERD, status post lumpectomy, status post appendectomy, hyperlipidemia, pulmonary nodules who comes in for cardiac follow-up evaluation.  She denies palpitations, dizziness or syncope.  She still gets shortness of breath unrelated to any particular activity. Coronary CTA done September 18, 2023 demonstrated a coronary calcium score of 13 units while in the left anterior descending artery, normal left main artery, left coronary artery with less than 30% plaque, minimal plaque of less than 30% in the mid and proximal regions, ramus intermediate branch of minimal less than 30% plaque, right coronary artery had minimal noncalcified plaque in the proximal mid and distal regions.  She is aware of the pulmonary nodules that were found, and she follows up with pulmonary medicine regarding that issue. Given the presence of coronary artery disease, and coronary calcification, the patient's LDL target is less than 70 mg/dL. I have asked her to discontinue her Zocor 20 mg daily.  She will start Crestor 20 mg/day.  She will have follow-up blood work in 6 to 8 weeks. Her blood pressure was also elevated today, and she will increase her Micardis to 80 mg/day. She is still complaining of dyspepsia at nighttime when lying in bed and have asked to follow-up with her primary care physician and gastroenterologist regarding this issue.  She is not sure if she is taking both Prilosec and pantoprazole which is on her medicine list, I have instructed her to only take the pantoprazole and check with her gastroenterologist regarding preference for PPI therapy. She is currently hemodynamically stable from a cardiac standpoint. Electrocardiogram done August 21, 2023 demonstrated normal sinus rhythm at a rate of 74 bpm is otherwise remarkable for small R waves in leads III and aVF. I have asked the patient to schedule coronary CTA to evaluate her coronary artery anatomy and ischemic burden. Echo Doppler examination done August 14, 2023 demonstrated normal left ventricular function with estimated ejection fraction 64%, mild left ventricular hypertrophy, mild mitral valve regurgitation, minimal tricuspid valve regurgitation, and trace aortic valve regurgitation. Lipid panel done February 26, 2023 demonstrated cholesterol 211, HDL 56, triglycerides 157, LDL direct 122, non-HDL cholesterol of 155 mg/dL and triglycerides 157 mg/dL with hemoglobin A1c of 5.6 She is on Zocor 20 mg/day for her lipids. She will have new blood work done for lipid profile prior to next visit.  She is instructed follow-up with her primary care physician and pulmonologist. The patient had a normal exercise stress test January 25, 2021. She will have blood work done prior to her next visit to check a lipid panel and SMA-20. She was born in [place] and has no history of rheumatic fever.  She does not drink excessive caffeine or alcohol.  Electrocardiogram done February while 2020 demonstrates normal sinus rhythm at a rate of 78 bpm is otherwise remarkable for left axis deviation. The patient understands that aerobic exercises must be increased to 40 minutes 4 times per week. A detailed discussion of lifestyle modification was done today. The patient has a good understanding of the diagnosis, and treatment plan. Lifestyle modification was also outlined.

## 2024-05-13 RX ORDER — EZETIMIBE 10 MG/1
10 TABLET ORAL DAILY
Qty: 90 | Refills: 1 | Status: ACTIVE | COMMUNITY
Start: 2024-05-13 | End: 1900-01-01

## 2024-07-02 LAB
ALBUMIN SERPL ELPH-MCNC: 4.4 G/DL
ALP BLD-CCNC: 69 U/L
ALT SERPL-CCNC: 16 U/L
AST SERPL-CCNC: 18 U/L
BILIRUB DIRECT SERPL-MCNC: 0.1 MG/DL
BILIRUB INDIRECT SERPL-MCNC: 0.2 MG/DL
BILIRUB SERPL-MCNC: 0.3 MG/DL
CHOLEST SERPL-MCNC: 159 MG/DL
HDLC SERPL-MCNC: 56 MG/DL
LDLC SERPL CALC-MCNC: 82 MG/DL
LDLC SERPL DIRECT ASSAY-MCNC: 88 MG/DL
NONHDLC SERPL-MCNC: 103 MG/DL
PROT SERPL-MCNC: 6.7 G/DL
TRIGL SERPL-MCNC: 113 MG/DL

## 2024-07-16 RX ORDER — EVOLOCUMAB 140 MG/ML
140 INJECTION, SOLUTION SUBCUTANEOUS
Qty: 3 | Refills: 1 | Status: ACTIVE | COMMUNITY
Start: 2024-07-09

## 2024-08-09 ENCOUNTER — APPOINTMENT (OUTPATIENT)
Dept: MRI IMAGING | Facility: CLINIC | Age: 69
End: 2024-08-09

## 2024-08-09 ENCOUNTER — APPOINTMENT (OUTPATIENT)
Dept: CT IMAGING | Facility: CLINIC | Age: 69
End: 2024-08-09

## 2024-08-09 ENCOUNTER — OUTPATIENT (OUTPATIENT)
Dept: OUTPATIENT SERVICES | Facility: HOSPITAL | Age: 69
LOS: 1 days | End: 2024-08-09
Payer: MEDICARE

## 2024-08-09 DIAGNOSIS — Z00.8 ENCOUNTER FOR OTHER GENERAL EXAMINATION: ICD-10-CM

## 2024-08-09 PROCEDURE — 70544 MR ANGIOGRAPHY HEAD W/O DYE: CPT | Mod: 26,59,MH

## 2024-08-09 PROCEDURE — 70553 MRI BRAIN STEM W/O & W/DYE: CPT | Mod: 26,MH

## 2024-08-09 PROCEDURE — 70543 MRI ORBT/FAC/NCK W/O &W/DYE: CPT | Mod: MH

## 2024-08-09 PROCEDURE — 70543 MRI ORBT/FAC/NCK W/O &W/DYE: CPT | Mod: 26,MH

## 2024-08-09 PROCEDURE — 70553 MRI BRAIN STEM W/O & W/DYE: CPT | Mod: MH

## 2024-08-09 PROCEDURE — 70547 MR ANGIOGRAPHY NECK W/O DYE: CPT | Mod: 26,MH

## 2024-08-09 PROCEDURE — 70544 MR ANGIOGRAPHY HEAD W/O DYE: CPT

## 2024-08-09 PROCEDURE — 70547 MR ANGIOGRAPHY NECK W/O DYE: CPT | Mod: MH

## 2024-08-09 PROCEDURE — A9585: CPT

## 2024-08-27 ENCOUNTER — NON-APPOINTMENT (OUTPATIENT)
Age: 69
End: 2024-08-27

## 2024-09-03 ENCOUNTER — APPOINTMENT (OUTPATIENT)
Dept: CARDIOLOGY | Facility: CLINIC | Age: 69
End: 2024-09-03
Payer: MEDICARE

## 2024-09-03 VITALS
OXYGEN SATURATION: 97 % | SYSTOLIC BLOOD PRESSURE: 125 MMHG | DIASTOLIC BLOOD PRESSURE: 80 MMHG | TEMPERATURE: 98.4 F | HEIGHT: 58 IN | RESPIRATION RATE: 16 BRPM | HEART RATE: 77 BPM | BODY MASS INDEX: 27.92 KG/M2 | WEIGHT: 133 LBS

## 2024-09-03 DIAGNOSIS — I34.0 NONRHEUMATIC MITRAL (VALVE) INSUFFICIENCY: ICD-10-CM

## 2024-09-03 DIAGNOSIS — E78.00 PURE HYPERCHOLESTEROLEMIA, UNSPECIFIED: ICD-10-CM

## 2024-09-03 DIAGNOSIS — I25.10 ATHEROSCLEROTIC HEART DISEASE OF NATIVE CORONARY ARTERY W/OUT ANGINA PECTORIS: ICD-10-CM

## 2024-09-03 DIAGNOSIS — R03.0 ELEVATED BLOOD-PRESSURE READING, W/OUT DIAGNOSIS OF HYPERTENSION: ICD-10-CM

## 2024-09-03 DIAGNOSIS — I07.1 RHEUMATIC TRICUSPID INSUFFICIENCY: ICD-10-CM

## 2024-09-03 DIAGNOSIS — I51.7 CARDIOMEGALY: ICD-10-CM

## 2024-09-03 PROCEDURE — 93306 TTE W/DOPPLER COMPLETE: CPT

## 2024-09-03 PROCEDURE — G2211 COMPLEX E/M VISIT ADD ON: CPT

## 2024-09-03 PROCEDURE — 99214 OFFICE O/P EST MOD 30 MIN: CPT

## 2024-09-03 NOTE — CARDIOLOGY SUMMARY
[___] : [unfilled] [de-identified] : 9/29/2021 [de-identified] : 1/25/2021 [de-identified] : 1/25/2021

## 2024-09-03 NOTE — ASSESSMENT
[FreeTextEntry1] : This is a 68-year-old female here today for follow-up cardiac evaluation.  She has a past medical history significant for GERD, status post lumpectomy, status post appendectomy, hyperlipidemia, pulmonary nodules.  She was born in [place] and has no history of rheumatic fever. She does not drink excessive caffeine or alcohol.  HPI: She is feeling generally well today and denies chest pain, dizziness, heart palpitations, recent episodes of syncope or falls. She may have some dyspnea on exertion when climbing stairs.   Current Medications: Rosuvastatin 20 mg daily, Zetia 10 mg daily, Telmisartan 80 mg daily, and Repatha 140 mg injection biweekly.   Patient started on Repatha to achieve target goal of < 70 mg/dL. States that both times she took the injection she began to feel lightheaded and weak and is fearful of fainting at this point if she were to continue. Recommended other options such as Praluent or Nexletol in addition to her Rosuvastatin and Zetia. She is agreeable to begin Nexletol 180 mg daily at this time and will repeat her blood work in 4-6 weeks.    BLOOD PRESSURE: Controlled.   BLOOD WORK:  *LDL target goal < 70* -Lipid panel done July 2024 demonstrated triglycerides 113, cholesterol 159, HDL 56, LDL 82, Non-, LDL direct 88.  -Lipid panel done May 2024 demonstrated triglycerides 144, cholesterol 205, HDL 62, , Non-, LDL direct 118.  -Lipid panel done February 26, 2023 demonstrated cholesterol 211, HDL 56, triglycerides 157, LDL direct 122, non-HDL cholesterol of 155 mg/dL and triglycerides 157 mg/dL with hemoglobin A1c of 5.6   TESTING/REPORTS:   -Echocardiogram done today 09/03/2024 in office with results pending.   -EKG done 05/09/2024 demonstrated regular sinus rhythm rate 67 BPM otherwise remarkable for nonspecific ST-T wave changes.   -September 18, 2023 demonstrated a coronary calcium score of 13 units while in the left anterior descending artery, normal left main artery, left coronary artery with less than 30% plaque, minimal plaque of less than 30% in the mid and proximal regions, ramus intermediate branch of minimal less than 30% plaque, right coronary artery had minimal noncalcified plaque in the proximal mid and distal regions.  She is aware of the pulmonary nodules that were found, and she follows up with pulmonary medicine regarding that issue.  -Electrocardiogram done August 21, 2023 demonstrated normal sinus rhythm at a rate of 74 bpm is otherwise remarkable for small R waves in leads III and aVF.  -Echo Doppler examination done August 14, 2023 demonstrated normal left ventricular function with estimated ejection fraction 64%, mild left ventricular hypertrophy, mild mitral valve regurgitation, minimal tricuspid valve regurgitation, and trace aortic valve regurgitation.  -EKG done 02/06/2023 which demonstrated regular sinus rhythm with nonspecific ST-T wave changes BPM of 98.  -EKG done 9/29/2021 which demonstrated regular sinus rhythm with nonspecific ST-T wave changes, BPM of 63.  -The patient had an echocardiogram done on 1/25/2021 demonstrated mild mitral and tricuspid regurgitation with normal left ventricular systolic function. -EF on echo reported to be 65%.  -The patient had a normal exercise stress test done on 1/25/2021   PLAN: -She will discontinue Repatha and begin Nexletol 180 mg daily.  -She will continue with her other current medications and will contact the office if she is having any complaints between now and their next follow up appointment. -She will schedule an exercise stress test to evaluate for significant coronary artery disease.  I have discussed the plan of care with Ms. APARNA NIX and she will follow up in 3-4 months. She is compliant with all of her medications.  The patient understands that aerobic exercises must be increased to minutes 4 times/week and a detailed discussion of lifestyle modification was done today. The patient has a good understanding of the diagnosis, treatment plan and lifestyle modification. She will contact me at the office for any questions with their care or any changes in their health status.   Dr Phillips, supervising physician, was present in the office with LINDA Muñoz NP during the guillen portions of the history and exam. The plan was discussed in detail as documented in the note.   LINDA Muñoz NP

## 2024-09-09 RX ORDER — BEMPEDOIC ACID 180 MG/1
180 TABLET, FILM COATED ORAL
Qty: 90 | Refills: 1 | Status: ACTIVE | COMMUNITY
Start: 2024-09-03

## 2024-10-22 ENCOUNTER — RX RENEWAL (OUTPATIENT)
Age: 69
End: 2024-10-22

## 2024-11-22 ENCOUNTER — APPOINTMENT (OUTPATIENT)
Dept: ULTRASOUND IMAGING | Facility: CLINIC | Age: 69
End: 2024-11-22
Payer: MEDICARE

## 2024-11-22 PROCEDURE — 76856 US EXAM PELVIC COMPLETE: CPT

## 2024-11-22 PROCEDURE — 76700 US EXAM ABDOM COMPLETE: CPT

## 2024-11-22 PROCEDURE — 76830 TRANSVAGINAL US NON-OB: CPT

## 2024-12-09 ENCOUNTER — OUTPATIENT (OUTPATIENT)
Dept: OUTPATIENT SERVICES | Facility: HOSPITAL | Age: 69
LOS: 1 days | End: 2024-12-09
Payer: MEDICARE

## 2024-12-09 ENCOUNTER — APPOINTMENT (OUTPATIENT)
Dept: CT IMAGING | Facility: CLINIC | Age: 69
End: 2024-12-09
Payer: MEDICARE

## 2024-12-09 ENCOUNTER — APPOINTMENT (OUTPATIENT)
Dept: CT IMAGING | Facility: CLINIC | Age: 69
End: 2024-12-09

## 2024-12-09 DIAGNOSIS — Z00.8 ENCOUNTER FOR OTHER GENERAL EXAMINATION: ICD-10-CM

## 2024-12-09 PROCEDURE — 74178 CT ABD&PLV WO CNTR FLWD CNTR: CPT | Mod: 26,MH

## 2024-12-09 PROCEDURE — 74178 CT ABD&PLV WO CNTR FLWD CNTR: CPT | Mod: MH

## 2024-12-24 PROBLEM — F10.90 ALCOHOL USE: Status: ACTIVE | Noted: 2017-07-06

## 2025-01-23 ENCOUNTER — LABORATORY RESULT (OUTPATIENT)
Age: 70
End: 2025-01-23

## 2025-01-23 ENCOUNTER — APPOINTMENT (OUTPATIENT)
Dept: CARDIOLOGY | Facility: CLINIC | Age: 70
End: 2025-01-23
Payer: MEDICARE

## 2025-01-23 VITALS
WEIGHT: 131 LBS | HEIGHT: 58 IN | DIASTOLIC BLOOD PRESSURE: 78 MMHG | TEMPERATURE: 97.7 F | OXYGEN SATURATION: 100 % | BODY MASS INDEX: 27.5 KG/M2 | RESPIRATION RATE: 16 BRPM | HEART RATE: 75 BPM | SYSTOLIC BLOOD PRESSURE: 124 MMHG

## 2025-01-23 DIAGNOSIS — I07.1 RHEUMATIC TRICUSPID INSUFFICIENCY: ICD-10-CM

## 2025-01-23 DIAGNOSIS — I34.0 NONRHEUMATIC MITRAL (VALVE) INSUFFICIENCY: ICD-10-CM

## 2025-01-23 DIAGNOSIS — E78.00 PURE HYPERCHOLESTEROLEMIA, UNSPECIFIED: ICD-10-CM

## 2025-01-23 DIAGNOSIS — R06.09 OTHER FORMS OF DYSPNEA: ICD-10-CM

## 2025-01-23 DIAGNOSIS — I25.10 ATHEROSCLEROTIC HEART DISEASE OF NATIVE CORONARY ARTERY W/OUT ANGINA PECTORIS: ICD-10-CM

## 2025-01-23 PROCEDURE — G2211 COMPLEX E/M VISIT ADD ON: CPT

## 2025-01-23 PROCEDURE — 99213 OFFICE O/P EST LOW 20 MIN: CPT | Mod: 25

## 2025-01-23 PROCEDURE — 93015 CV STRESS TEST SUPVJ I&R: CPT

## 2025-03-20 RX ORDER — UMECLIDINIUM 62.5 UG/1
62.5 AEROSOL, POWDER ORAL
Qty: 3 | Refills: 1 | Status: ACTIVE | COMMUNITY
Start: 2025-03-20 | End: 1900-01-01

## 2025-07-28 ENCOUNTER — APPOINTMENT (OUTPATIENT)
Dept: CARDIOLOGY | Facility: CLINIC | Age: 70
End: 2025-07-28
Payer: MEDICARE

## 2025-07-28 ENCOUNTER — LABORATORY RESULT (OUTPATIENT)
Age: 70
End: 2025-07-28

## 2025-07-28 ENCOUNTER — APPOINTMENT (OUTPATIENT)
Dept: PULMONOLOGY | Facility: CLINIC | Age: 70
End: 2025-07-28
Payer: MEDICARE

## 2025-07-28 ENCOUNTER — NON-APPOINTMENT (OUTPATIENT)
Age: 70
End: 2025-07-28

## 2025-07-28 VITALS
RESPIRATION RATE: 16 BRPM | BODY MASS INDEX: 26.03 KG/M2 | HEART RATE: 63 BPM | HEIGHT: 58 IN | OXYGEN SATURATION: 100 % | WEIGHT: 124 LBS | TEMPERATURE: 96.7 F | DIASTOLIC BLOOD PRESSURE: 78 MMHG | SYSTOLIC BLOOD PRESSURE: 124 MMHG

## 2025-07-28 VITALS
WEIGHT: 126 LBS | TEMPERATURE: 97.2 F | BODY MASS INDEX: 26.45 KG/M2 | HEART RATE: 74 BPM | RESPIRATION RATE: 16 BRPM | SYSTOLIC BLOOD PRESSURE: 128 MMHG | DIASTOLIC BLOOD PRESSURE: 80 MMHG | HEIGHT: 58 IN | OXYGEN SATURATION: 98 %

## 2025-07-28 VITALS
HEIGHT: 58 IN | RESPIRATION RATE: 16 BRPM | TEMPERATURE: 96.7 F | OXYGEN SATURATION: 100 % | HEART RATE: 63 BPM | WEIGHT: 124 LBS | BODY MASS INDEX: 26.03 KG/M2

## 2025-07-28 DIAGNOSIS — J45.909 UNSPECIFIED ASTHMA, UNCOMPLICATED: ICD-10-CM

## 2025-07-28 DIAGNOSIS — I25.10 ATHEROSCLEROTIC HEART DISEASE OF NATIVE CORONARY ARTERY W/OUT ANGINA PECTORIS: ICD-10-CM

## 2025-07-28 DIAGNOSIS — I07.1 RHEUMATIC TRICUSPID INSUFFICIENCY: ICD-10-CM

## 2025-07-28 DIAGNOSIS — R93.89 ABNORMAL FINDINGS ON DIAGNOSTIC IMAGING OF OTHER SPECIFIED BODY STRUCTURES: ICD-10-CM

## 2025-07-28 DIAGNOSIS — I51.7 CARDIOMEGALY: ICD-10-CM

## 2025-07-28 DIAGNOSIS — E78.00 PURE HYPERCHOLESTEROLEMIA, UNSPECIFIED: ICD-10-CM

## 2025-07-28 DIAGNOSIS — Z12.2 ENCOUNTER FOR SCREENING FOR MALIGNANT NEOPLASM OF RESPIRATORY ORGANS: ICD-10-CM

## 2025-07-28 DIAGNOSIS — R07.89 OTHER CHEST PAIN: ICD-10-CM

## 2025-07-28 DIAGNOSIS — R79.89 OTHER SPECIFIED ABNORMAL FINDINGS OF BLOOD CHEMISTRY: ICD-10-CM

## 2025-07-28 DIAGNOSIS — J44.9 CHRONIC OBSTRUCTIVE PULMONARY DISEASE, UNSPECIFIED: ICD-10-CM

## 2025-07-28 DIAGNOSIS — K21.9 GASTRO-ESOPHAGEAL REFLUX DISEASE W/OUT ESOPHAGITIS: ICD-10-CM

## 2025-07-28 DIAGNOSIS — R06.02 SHORTNESS OF BREATH: ICD-10-CM

## 2025-07-28 DIAGNOSIS — G47.33 OBSTRUCTIVE SLEEP APNEA (ADULT) (PEDIATRIC): ICD-10-CM

## 2025-07-28 DIAGNOSIS — F17.200 NICOTINE DEPENDENCE, UNSPECIFIED, UNCOMPLICATED: ICD-10-CM

## 2025-07-28 DIAGNOSIS — R03.0 ELEVATED BLOOD-PRESSURE READING, W/OUT DIAGNOSIS OF HYPERTENSION: ICD-10-CM

## 2025-07-28 DIAGNOSIS — I34.0 NONRHEUMATIC MITRAL (VALVE) INSUFFICIENCY: ICD-10-CM

## 2025-07-28 PROCEDURE — 93000 ELECTROCARDIOGRAM COMPLETE: CPT

## 2025-07-28 PROCEDURE — ZZZZZ: CPT

## 2025-07-28 PROCEDURE — 94729 DIFFUSING CAPACITY: CPT

## 2025-07-28 PROCEDURE — G2211 COMPLEX E/M VISIT ADD ON: CPT

## 2025-07-28 PROCEDURE — 99214 OFFICE O/P EST MOD 30 MIN: CPT

## 2025-07-28 PROCEDURE — 94727 GAS DIL/WSHOT DETER LNG VOL: CPT

## 2025-07-28 PROCEDURE — 94010 BREATHING CAPACITY TEST: CPT

## 2025-07-28 PROCEDURE — G0296 VISIT TO DETERM LDCT ELIG: CPT

## 2025-07-28 PROCEDURE — 99214 OFFICE O/P EST MOD 30 MIN: CPT | Mod: 25

## 2025-07-28 PROCEDURE — 99406 BEHAV CHNG SMOKING 3-10 MIN: CPT | Mod: 25

## 2025-07-28 PROCEDURE — 95012 NITRIC OXIDE EXP GAS DETER: CPT
